# Patient Record
Sex: FEMALE | Race: WHITE | Employment: UNEMPLOYED | URBAN - METROPOLITAN AREA
[De-identification: names, ages, dates, MRNs, and addresses within clinical notes are randomized per-mention and may not be internally consistent; named-entity substitution may affect disease eponyms.]

---

## 2019-09-19 ENCOUNTER — OFFICE VISIT (OUTPATIENT)
Dept: INTERNAL MEDICINE CLINIC | Age: 29
End: 2019-09-19

## 2019-09-19 VITALS
OXYGEN SATURATION: 99 % | HEART RATE: 76 BPM | WEIGHT: 174 LBS | DIASTOLIC BLOOD PRESSURE: 77 MMHG | TEMPERATURE: 98.4 F | BODY MASS INDEX: 29.71 KG/M2 | SYSTOLIC BLOOD PRESSURE: 125 MMHG | HEIGHT: 64 IN | RESPIRATION RATE: 20 BRPM

## 2019-09-19 DIAGNOSIS — R60.9 PERIPHERAL EDEMA: Primary | ICD-10-CM

## 2019-09-19 DIAGNOSIS — L30.9 ECZEMA, UNSPECIFIED TYPE: ICD-10-CM

## 2019-09-19 DIAGNOSIS — R07.89 ATYPICAL CHEST PAIN: ICD-10-CM

## 2019-09-19 RX ORDER — PREDNISONE 10 MG/1
TABLET ORAL
Refills: 0 | COMMUNITY
Start: 2019-09-10 | End: 2019-09-30 | Stop reason: ALTCHOICE

## 2019-09-19 RX ORDER — TRIAMCINOLONE ACETONIDE 1 MG/G
OINTMENT TOPICAL 2 TIMES DAILY
Qty: 30 G | Refills: 0 | Status: SHIPPED | OUTPATIENT
Start: 2019-09-19 | End: 2020-01-23

## 2019-09-19 RX ORDER — HYDROXYZINE PAMOATE 25 MG/1
CAPSULE ORAL
Refills: 0 | COMMUNITY
Start: 2019-09-10 | End: 2021-09-08

## 2019-09-19 NOTE — PROGRESS NOTES
Chief Complaint   Patient presents with   Honey Garcia Care    Skin Problem     both hands    Swelling     ankles and feet    Ear Pain    Back Pain     She is a 30 y.o. female who presents to the office to get established. She does not speak english and so today we will be using the blue phone for assistance. She reports she has not seen a doctor in a very long time. Today she has several concerns to discuss. She has a history of of eczema and currently works in a d[employer]. She reports she does use her hands a lot during the work day but although she is wearing gloves she is still having a lot of cracking of the skin. She has been to the hospital before for this and was prescribed an antihistamine and oral steroids. This did not work and she was told she would probably need to see a dermatologist.   She states she has been having swelling of her feet and ankles. She is on her feet a lot at work. She reports she does not use salt on her foods. She has not had labs done in a very long time. She states she has had some chest pain with deep breathing that started yesterday. She states she does not remember hurting herself. She denies shortness of breath.     No Known Allergies  Past Medical History:   Diagnosis Date    Eczema      Family History   Problem Relation Age of Onset    Heart Disease Father     Breast Cancer Paternal Aunt     Diabetes Paternal Aunt      Past Surgical History:   Procedure Laterality Date    HX CHOLECYSTECTOMY  12/11/2013    [facility]     Social History     Socioeconomic History    Marital status: SINGLE     Spouse name: Not on file    Number of children: Not on file    Years of education: Not on file    Highest education level: Not on file   Occupational History    Not on file   Social Needs    Financial resource strain: Not on file    Food insecurity:     Worry: Not on file     Inability: Not on file    Transportation needs:     Medical: Not on file     Non-medical: Not on file Tobacco Use    Smoking status: Current Every Day Smoker     Years: 3.00     Types: Cigarettes    Smokeless tobacco: Never Used    Tobacco comment: 2 cigarettes a day   Substance and Sexual Activity    Alcohol use: Never     Frequency: Never    Drug use: Never    Sexual activity: Yes     Partners: Male     Birth control/protection: None     Comment: no protection   Lifestyle    Physical activity:     Days per week: Not on file     Minutes per session: Not on file    Stress: Not on file   Relationships    Social connections:     Talks on phone: Not on file     Gets together: Not on file     Attends Oriental orthodox service: Not on file     Active member of club or organization: Not on file     Attends meetings of clubs or organizations: Not on file     Relationship status: Not on file    Intimate partner violence:     Fear of current or ex partner: Not on file     Emotionally abused: Not on file     Physically abused: Not on file     Forced sexual activity: Not on file   Other Topics Concern    Not on file   Social History Narrative    Not on file       Reviewed PmHx, RxHx, FmHx, SocHx, AllgHx and updated and dated in the chart. There are no active problems to display for this patient. Review of Systems - negative except as listed above in the HPI    Objective:     Vitals:    09/19/19 0823   BP: 125/77   Pulse: 76   Resp: 20   Temp: 98.4 °F (36.9 °C)   TempSrc: Oral   SpO2: 99%   Weight: 174 lb (78.9 kg)   Height: 5' 4\" (1.626 m)     Physical Examination: General appearance - alert, well appearing, and in no distress  Chest - clear to auscultation, no wheezes, rales or rhonchi, symmetric air entry  Heart - normal rate and regular rhythm, no murmurs noted  Musculoskeletal - tenderness noted of upper back left side. Anterior chest pain with deep breathing.  Tenderness noted with palpation  Extremities - pedal edema trace edema +, intact peripheral pulses    Assessment/ Plan:   Diagnoses and all orders for this visit:    1. Peripheral edema  -     METABOLIC PANEL, COMPREHENSIVE    2. Atypical chest pain  -     XR CHEST PA LAT; Future    3. Eczema, unspecified type  -     triamcinolone acetonide (KENALOG) 0.1 % ointment; Apply  to affected area two (2) times a day. use thin layer  -     REFERRAL TO DERMATOLOGY       patient to get chest x-ray done today, I would like for her to get a lab done today. She will be contact with the results. We discussed eczema today and things she should avoid that may make her eczema worse. I did give her a referral to see the dermatologist as well. I suspect her pain is muscular. We talked about causes of edema. I would like for her to get shoes with better support when she is on her feet for prolonged periods of time. She is to get some support stockings to wear during the day. She will be contact with the results on the labs. I have ask that the patient return in one week so se can continue to address some more of her concerns. I have discussed the diagnosis with the patient and the intended plan as seen in the above orders. The patient understands and agrees with the plan. The patient has received an after-visit summary and questions were answered concerning future plans. Medication Side Effects and Warnings were discussed with patient  Patient Labs were reviewed and or requested:  Patient Past Records were reviewed and or requested    Cee Huddleston PA-C    There are no Patient Instructions on file for this visit.

## 2019-09-19 NOTE — PROGRESS NOTES
Patient states she is willing to quit smoking, will need counseling. Patient has not had a pap in a long time. Patient has a skin issue on both hands. Patient has swelling in both feet and ankles. Patient states she bruises easily. Ear  Fullness. Left back pain that radiates to the left chest area.

## 2019-09-20 ENCOUNTER — HOSPITAL ENCOUNTER (OUTPATIENT)
Dept: GENERAL RADIOLOGY | Age: 29
Discharge: HOME | End: 2019-09-20
Payer: MEDICAID

## 2019-09-20 DIAGNOSIS — R07.89 ATYPICAL CHEST PAIN: ICD-10-CM

## 2019-09-20 LAB
ALBUMIN SERPL-MCNC: 4.1 G/DL (ref 3.5–5.5)
ALBUMIN/GLOB SERPL: 1.6 {RATIO} (ref 1.2–2.2)
ALP SERPL-CCNC: 67 IU/L (ref 39–117)
ALT SERPL-CCNC: 11 IU/L (ref 0–32)
AST SERPL-CCNC: 15 IU/L (ref 0–40)
BILIRUB SERPL-MCNC: <0.2 MG/DL (ref 0–1.2)
BUN SERPL-MCNC: 7 MG/DL (ref 6–20)
BUN/CREAT SERPL: 11 (ref 9–23)
CALCIUM SERPL-MCNC: 9.1 MG/DL (ref 8.7–10.2)
CHLORIDE SERPL-SCNC: 104 MMOL/L (ref 96–106)
CO2 SERPL-SCNC: 20 MMOL/L (ref 20–29)
CREAT SERPL-MCNC: 0.63 MG/DL (ref 0.57–1)
GLOBULIN SER CALC-MCNC: 2.6 G/DL (ref 1.5–4.5)
GLUCOSE SERPL-MCNC: 81 MG/DL (ref 65–99)
POTASSIUM SERPL-SCNC: 4.6 MMOL/L (ref 3.5–5.2)
PROT SERPL-MCNC: 6.7 G/DL (ref 6–8.5)
SODIUM SERPL-SCNC: 138 MMOL/L (ref 134–144)

## 2019-09-20 PROCEDURE — 71046 X-RAY EXAM CHEST 2 VIEWS: CPT

## 2019-09-20 NOTE — PROGRESS NOTES
Writer attempted to reach patient and contact PHI on file, no answer. Sent letter with results in [address] and to contact the office with information regarding the chest xray.

## 2019-09-30 ENCOUNTER — OFFICE VISIT (OUTPATIENT)
Dept: INTERNAL MEDICINE CLINIC | Age: 29
End: 2019-09-30

## 2019-09-30 VITALS
DIASTOLIC BLOOD PRESSURE: 70 MMHG | WEIGHT: 175 LBS | HEART RATE: 75 BPM | SYSTOLIC BLOOD PRESSURE: 129 MMHG | BODY MASS INDEX: 29.88 KG/M2 | TEMPERATURE: 98.3 F | HEIGHT: 64 IN | OXYGEN SATURATION: 99 % | RESPIRATION RATE: 20 BRPM

## 2019-09-30 DIAGNOSIS — R60.0 MILD PERIPHERAL EDEMA: Primary | ICD-10-CM

## 2019-09-30 DIAGNOSIS — E66.9 OBESITY (BMI 30.0-34.9): ICD-10-CM

## 2019-09-30 DIAGNOSIS — Z83.3 FAMILY HISTORY OF DIABETES MELLITUS (DM): ICD-10-CM

## 2019-09-30 LAB — HBA1C MFR BLD HPLC: 5.4 %

## 2019-09-30 NOTE — PROGRESS NOTES
Patient states she is following up from previous visit in 1 week. Chief Complaint   Patient presents with    Follow Up Chronic Condition     she is a 30y.o. year old female who presents for follow up. She states since the last visit her chest pain has resolved. She has not picked up support stockings or got new shoes. She reports she is still having edema to her. She denies shortness of breath or chest pain. She states she is not eating salt. Reviewed and agree with Nurse Note and duplicated in this note. Reviewed PmHx, RxHx, FmHx, SocHx, AllgHx and updated and dated in the chart. Review of Systems - negative except as listed above    Objective:     Vitals:    09/30/19 0943   BP: 129/70   Pulse: 75   Resp: 20   Temp: 98.3 °F (36.8 °C)   TempSrc: Oral   SpO2: 99%   Weight: 175 lb (79.4 kg)   Height: 5' 4\" (1.626 m)     Physical Examination: General appearance - alert, well appearing, and in no distress and overweight  Chest - clear to auscultation, no wheezes, rales or rhonchi, symmetric air entry  Heart - normal rate and regular rhythm, no murmurs noted  Extremities - no pedal edema noted, intact peripheral pulses    Assessment/ Plan:   Diagnoses and all orders for this visit:    1. Mild peripheral edema    2. Obesity (BMI 30.0-34.9)    3. Family history of diabetes mellitus (DM)  -     AMB POC HEMOGLOBIN A1C       I explained to the patient that I really am not able to appreciate any pitting edema today. Her feet is fat. I would like for her to work on her weight and get the compression stockings and better shoes like we discussed. I understand she is not shaking salt on her food but she may be taking in hidden sodium. I have print off for her some information about a healthy diet. I can get her an appointment with a dietician if she feel she need additional help with this. Today's visit was accomplished with the help of an .     Total encounter time was 25 minutes;>50 percent of time spent counseling/coordinatin care regarding peripheral edema, diet changes, and getting proper shoe wear for long periods of standing on the hard floor. I have discussed the diagnosis with the patient and the intended plan as seen in the above orders. The patient has received an after-visit summary and questions were answered concerning future plans.      Medication Side Effects and Warnings were discussed with patient: n/a  Patient Labs were reviewed and or requested: yes  Patient Past Records were reviewed and or requested  yes    Catalina Farah PA-C

## 2019-10-14 ENCOUNTER — TELEPHONE (OUTPATIENT)
Dept: INTERNAL MEDICINE CLINIC | Age: 29
End: 2019-10-14

## 2019-10-14 NOTE — LETTER
10/14/2019 2:42 PM 
 
Ms. Dsouza Devoid [address] [place] [address] To whom it may concern: A person can not spread eczema to others. However, if you have open wounds I would not advise handling food without protective gear. This would best to protect the patient and the patrons. Please feel free to contact me if additional concerns.  
 
 
 
 
Sincerely, 
 
 
Jerome Farah PA-C

## 2020-01-22 DIAGNOSIS — L30.9 ECZEMA, UNSPECIFIED TYPE: ICD-10-CM

## 2020-01-23 RX ORDER — TRIAMCINOLONE ACETONIDE 1 MG/G
OINTMENT TOPICAL 2 TIMES DAILY
Qty: 30 G | Refills: 0 | Status: SHIPPED | OUTPATIENT
Start: 2020-01-23 | End: 2020-06-04 | Stop reason: SDUPTHER

## 2020-06-04 DIAGNOSIS — L30.9 ECZEMA, UNSPECIFIED TYPE: ICD-10-CM

## 2020-06-04 RX ORDER — TRIAMCINOLONE ACETONIDE 1 MG/G
OINTMENT TOPICAL 2 TIMES DAILY
Qty: 30 G | Refills: 0 | Status: SHIPPED | OUTPATIENT
Start: 2020-06-04 | End: 2021-09-08

## 2021-09-02 ENCOUNTER — APPOINTMENT (OUTPATIENT)
Dept: GENERAL RADIOLOGY | Age: 31
End: 2021-09-02
Attending: PHYSICIAN ASSISTANT
Payer: MEDICAID

## 2021-09-02 ENCOUNTER — HOSPITAL ENCOUNTER (EMERGENCY)
Age: 31
Discharge: ANOTHER HOSPITAL | End: 2021-09-02
Attending: EMERGENCY MEDICINE
Payer: MEDICAID

## 2021-09-02 ENCOUNTER — HOSPITAL ENCOUNTER (INPATIENT)
Age: 31
LOS: 6 days | Discharge: HOME | DRG: 385 | End: 2021-09-08
Attending: INTERNAL MEDICINE | Admitting: INTERNAL MEDICINE
Payer: MEDICAID

## 2021-09-02 VITALS
OXYGEN SATURATION: 100 % | TEMPERATURE: 98.7 F | DIASTOLIC BLOOD PRESSURE: 55 MMHG | RESPIRATION RATE: 20 BRPM | SYSTOLIC BLOOD PRESSURE: 109 MMHG | HEIGHT: 65 IN | HEART RATE: 85 BPM | WEIGHT: 175 LBS | BODY MASS INDEX: 29.16 KG/M2

## 2021-09-02 DIAGNOSIS — Z88.2 ALLERGY TO SULFA DRUGS: ICD-10-CM

## 2021-09-02 DIAGNOSIS — A41.9 SEPTIC SHOCK (HCC): Primary | ICD-10-CM

## 2021-09-02 DIAGNOSIS — L03.116 CELLULITIS OF LEFT LOWER EXTREMITY: ICD-10-CM

## 2021-09-02 DIAGNOSIS — R65.21 SEPTIC SHOCK (HCC): Primary | ICD-10-CM

## 2021-09-02 LAB
ALBUMIN SERPL-MCNC: 2.9 G/DL (ref 3.5–5)
ALBUMIN/GLOB SERPL: 0.7 {RATIO} (ref 1.1–2.2)
ALP SERPL-CCNC: 57 U/L (ref 45–117)
ALT SERPL-CCNC: 22 U/L (ref 12–78)
ANION GAP SERPL CALC-SCNC: 9 MMOL/L (ref 5–15)
APPEARANCE UR: CLEAR
AST SERPL-CCNC: 21 U/L (ref 15–37)
BACTERIA URNS QL MICRO: NEGATIVE /HPF
BASOPHILS # BLD: 0 K/UL (ref 0–0.1)
BASOPHILS NFR BLD: 0 % (ref 0–1)
BILIRUB SERPL-MCNC: 0.3 MG/DL (ref 0.2–1)
BILIRUB UR QL: NEGATIVE
BUN SERPL-MCNC: 6 MG/DL (ref 6–20)
BUN/CREAT SERPL: 7 (ref 12–20)
CALCIUM SERPL-MCNC: 7.7 MG/DL (ref 8.5–10.1)
CHLORIDE SERPL-SCNC: 101 MMOL/L (ref 97–108)
CO2 SERPL-SCNC: 22 MMOL/L (ref 21–32)
COLOR UR: ABNORMAL
CREAT SERPL-MCNC: 0.86 MG/DL (ref 0.55–1.02)
DIFFERENTIAL METHOD BLD: ABNORMAL
EOSINOPHIL # BLD: 0 K/UL (ref 0–0.4)
EOSINOPHIL NFR BLD: 0 % (ref 0–7)
EPITH CASTS URNS QL MICRO: ABNORMAL /LPF
ERYTHROCYTE [DISTWIDTH] IN BLOOD BY AUTOMATED COUNT: 15.6 % (ref 11.5–14.5)
FLUAV RNA SPEC QL NAA+PROBE: NOT DETECTED
FLUBV RNA SPEC QL NAA+PROBE: NOT DETECTED
GLOBULIN SER CALC-MCNC: 4 G/DL (ref 2–4)
GLUCOSE SERPL-MCNC: 103 MG/DL (ref 65–100)
GLUCOSE UR STRIP.AUTO-MCNC: NEGATIVE MG/DL
HCG UR QL: NEGATIVE
HCT VFR BLD AUTO: 38.8 % (ref 35–47)
HGB BLD-MCNC: 12.7 G/DL (ref 11.5–16)
HGB UR QL STRIP: ABNORMAL
IMM GRANULOCYTES # BLD AUTO: 0.1 K/UL (ref 0–0.04)
IMM GRANULOCYTES NFR BLD AUTO: 0 % (ref 0–0.5)
KETONES UR QL STRIP.AUTO: NEGATIVE MG/DL
LACTATE SERPL-SCNC: 1.5 MMOL/L (ref 0.4–2)
LEUKOCYTE ESTERASE UR QL STRIP.AUTO: ABNORMAL
LIPASE SERPL-CCNC: 77 U/L (ref 73–393)
LYMPHOCYTES # BLD: 1.3 K/UL (ref 0.8–3.5)
LYMPHOCYTES NFR BLD: 12 % (ref 12–49)
MCH RBC QN AUTO: 26.7 PG (ref 26–34)
MCHC RBC AUTO-ENTMCNC: 32.7 G/DL (ref 30–36.5)
MCV RBC AUTO: 81.5 FL (ref 80–99)
MONOCYTES # BLD: 0.2 K/UL (ref 0–1)
MONOCYTES NFR BLD: 2 % (ref 5–13)
NEUTS SEG # BLD: 9.8 K/UL (ref 1.8–8)
NEUTS SEG NFR BLD: 86 % (ref 32–75)
NITRITE UR QL STRIP.AUTO: NEGATIVE
NRBC # BLD: 0 K/UL (ref 0–0.01)
NRBC BLD-RTO: 0 PER 100 WBC
PH UR STRIP: 6 [PH] (ref 5–8)
PLATELET # BLD AUTO: 299 K/UL (ref 150–400)
PMV BLD AUTO: 9.6 FL (ref 8.9–12.9)
POTASSIUM SERPL-SCNC: 3.4 MMOL/L (ref 3.5–5.1)
PROT SERPL-MCNC: 6.9 G/DL (ref 6.4–8.2)
PROT UR STRIP-MCNC: ABNORMAL MG/DL
RBC # BLD AUTO: 4.76 M/UL (ref 3.8–5.2)
RBC #/AREA URNS HPF: ABNORMAL /HPF (ref 0–5)
SARS-COV-2, COV2: NOT DETECTED
SODIUM SERPL-SCNC: 132 MMOL/L (ref 136–145)
SP GR UR REFRACTOMETRY: 1.01 (ref 1–1.03)
UA: UC IF INDICATED,UAUC: ABNORMAL
UROBILINOGEN UR QL STRIP.AUTO: 0.2 EU/DL (ref 0.2–1)
WBC # BLD AUTO: 11.3 K/UL (ref 3.6–11)
WBC URNS QL MICRO: ABNORMAL /HPF (ref 0–4)

## 2021-09-02 PROCEDURE — 36415 COLL VENOUS BLD VENIPUNCTURE: CPT

## 2021-09-02 PROCEDURE — 74011250636 HC RX REV CODE- 250/636: Performed by: PHYSICIAN ASSISTANT

## 2021-09-02 PROCEDURE — 74011000258 HC RX REV CODE- 258: Performed by: EMERGENCY MEDICINE

## 2021-09-02 PROCEDURE — 65610000006 HC RM INTENSIVE CARE

## 2021-09-02 PROCEDURE — 83690 ASSAY OF LIPASE: CPT

## 2021-09-02 PROCEDURE — 93005 ELECTROCARDIOGRAM TRACING: CPT

## 2021-09-02 PROCEDURE — 75810000455 HC PLCMT CENT VENOUS CATH LVL 2 5182

## 2021-09-02 PROCEDURE — 96367 TX/PROPH/DG ADDL SEQ IV INF: CPT

## 2021-09-02 PROCEDURE — 80053 COMPREHEN METABOLIC PANEL: CPT

## 2021-09-02 PROCEDURE — 96365 THER/PROPH/DIAG IV INF INIT: CPT

## 2021-09-02 PROCEDURE — 81001 URINALYSIS AUTO W/SCOPE: CPT

## 2021-09-02 PROCEDURE — 74011250637 HC RX REV CODE- 250/637: Performed by: PHYSICIAN ASSISTANT

## 2021-09-02 PROCEDURE — 81025 URINE PREGNANCY TEST: CPT

## 2021-09-02 PROCEDURE — 74011000250 HC RX REV CODE- 250: Performed by: EMERGENCY MEDICINE

## 2021-09-02 PROCEDURE — 96361 HYDRATE IV INFUSION ADD-ON: CPT

## 2021-09-02 PROCEDURE — 71045 X-RAY EXAM CHEST 1 VIEW: CPT

## 2021-09-02 PROCEDURE — 85025 COMPLETE CBC W/AUTO DIFF WBC: CPT

## 2021-09-02 PROCEDURE — 87636 SARSCOV2 & INF A&B AMP PRB: CPT

## 2021-09-02 PROCEDURE — 74011250636 HC RX REV CODE- 250/636: Performed by: EMERGENCY MEDICINE

## 2021-09-02 PROCEDURE — 96366 THER/PROPH/DIAG IV INF ADDON: CPT

## 2021-09-02 PROCEDURE — 96375 TX/PRO/DX INJ NEW DRUG ADDON: CPT

## 2021-09-02 PROCEDURE — 83605 ASSAY OF LACTIC ACID: CPT

## 2021-09-02 PROCEDURE — 96372 THER/PROPH/DIAG INJ SC/IM: CPT

## 2021-09-02 PROCEDURE — 99285 EMERGENCY DEPT VISIT HI MDM: CPT

## 2021-09-02 PROCEDURE — 87040 BLOOD CULTURE FOR BACTERIA: CPT

## 2021-09-02 RX ORDER — ACETAMINOPHEN 650 MG/1
650 SUPPOSITORY RECTAL
Status: DISCONTINUED | OUTPATIENT
Start: 2021-09-02 | End: 2021-09-08 | Stop reason: HOSPADM

## 2021-09-02 RX ORDER — DIPHENHYDRAMINE HYDROCHLORIDE 50 MG/ML
25 INJECTION, SOLUTION INTRAMUSCULAR; INTRAVENOUS
Status: COMPLETED | OUTPATIENT
Start: 2021-09-02 | End: 2021-09-02

## 2021-09-02 RX ORDER — CLINDAMYCIN PHOSPHATE 600 MG/50ML
600 INJECTION INTRAVENOUS
Status: COMPLETED | OUTPATIENT
Start: 2021-09-02 | End: 2021-09-02

## 2021-09-02 RX ORDER — EPINEPHRINE 1 MG/ML
0.3 INJECTION, SOLUTION, CONCENTRATE INTRAVENOUS
Status: COMPLETED | OUTPATIENT
Start: 2021-09-02 | End: 2021-09-02

## 2021-09-02 RX ORDER — ENOXAPARIN SODIUM 100 MG/ML
40 INJECTION SUBCUTANEOUS DAILY
Status: DISCONTINUED | OUTPATIENT
Start: 2021-09-03 | End: 2021-09-08 | Stop reason: HOSPADM

## 2021-09-02 RX ORDER — ACETAMINOPHEN 325 MG/1
650 TABLET ORAL
Status: DISCONTINUED | OUTPATIENT
Start: 2021-09-02 | End: 2021-09-08 | Stop reason: HOSPADM

## 2021-09-02 RX ORDER — SODIUM CHLORIDE 0.9 % (FLUSH) 0.9 %
5-40 SYRINGE (ML) INJECTION EVERY 8 HOURS
Status: DISCONTINUED | OUTPATIENT
Start: 2021-09-03 | End: 2021-09-08 | Stop reason: HOSPADM

## 2021-09-02 RX ORDER — ONDANSETRON 2 MG/ML
4 INJECTION INTRAMUSCULAR; INTRAVENOUS
Status: DISCONTINUED | OUTPATIENT
Start: 2021-09-02 | End: 2021-09-08 | Stop reason: HOSPADM

## 2021-09-02 RX ORDER — LORAZEPAM 0.5 MG/1
0.5 TABLET ORAL
Status: DISCONTINUED | OUTPATIENT
Start: 2021-09-02 | End: 2021-09-08 | Stop reason: HOSPADM

## 2021-09-02 RX ORDER — POLYETHYLENE GLYCOL 3350 17 G/17G
17 POWDER, FOR SOLUTION ORAL DAILY PRN
Status: DISCONTINUED | OUTPATIENT
Start: 2021-09-02 | End: 2021-09-08 | Stop reason: HOSPADM

## 2021-09-02 RX ORDER — ACETAMINOPHEN 500 MG
1000 TABLET ORAL
Status: COMPLETED | OUTPATIENT
Start: 2021-09-02 | End: 2021-09-02

## 2021-09-02 RX ORDER — FAMOTIDINE 20 MG/1
20 TABLET, FILM COATED ORAL
Status: COMPLETED | OUTPATIENT
Start: 2021-09-02 | End: 2021-09-02

## 2021-09-02 RX ORDER — KETOROLAC TROMETHAMINE 30 MG/ML
30 INJECTION, SOLUTION INTRAMUSCULAR; INTRAVENOUS ONCE
Status: COMPLETED | OUTPATIENT
Start: 2021-09-02 | End: 2021-09-02

## 2021-09-02 RX ORDER — SODIUM CHLORIDE 0.9 % (FLUSH) 0.9 %
5-40 SYRINGE (ML) INJECTION AS NEEDED
Status: DISCONTINUED | OUTPATIENT
Start: 2021-09-02 | End: 2021-09-08 | Stop reason: HOSPADM

## 2021-09-02 RX ORDER — IPRATROPIUM BROMIDE AND ALBUTEROL SULFATE 2.5; .5 MG/3ML; MG/3ML
3 SOLUTION RESPIRATORY (INHALATION)
Status: DISCONTINUED | OUTPATIENT
Start: 2021-09-03 | End: 2021-09-03

## 2021-09-02 RX ORDER — CLINDAMYCIN HYDROCHLORIDE 300 MG/1
300 CAPSULE ORAL 4 TIMES DAILY
COMMUNITY
End: 2021-09-08

## 2021-09-02 RX ORDER — SODIUM CHLORIDE, SODIUM LACTATE, POTASSIUM CHLORIDE, CALCIUM CHLORIDE 600; 310; 30; 20 MG/100ML; MG/100ML; MG/100ML; MG/100ML
100 INJECTION, SOLUTION INTRAVENOUS CONTINUOUS
Status: DISCONTINUED | OUTPATIENT
Start: 2021-09-03 | End: 2021-09-06

## 2021-09-02 RX ORDER — SODIUM CHLORIDE 0.9 % (FLUSH) 0.9 %
5-10 SYRINGE (ML) INJECTION AS NEEDED
Status: DISCONTINUED | OUTPATIENT
Start: 2021-09-02 | End: 2021-09-02 | Stop reason: HOSPADM

## 2021-09-02 RX ORDER — NOREPINEPHRINE BITARTRATE/D5W 8 MG/250ML
.5-2 PLASTIC BAG, INJECTION (ML) INTRAVENOUS
Status: DISCONTINUED | OUTPATIENT
Start: 2021-09-02 | End: 2021-09-02

## 2021-09-02 RX ORDER — ONDANSETRON 2 MG/ML
4 INJECTION INTRAMUSCULAR; INTRAVENOUS
Status: COMPLETED | OUTPATIENT
Start: 2021-09-02 | End: 2021-09-02

## 2021-09-02 RX ORDER — ONDANSETRON 4 MG/1
4 TABLET, ORALLY DISINTEGRATING ORAL
Status: DISCONTINUED | OUTPATIENT
Start: 2021-09-02 | End: 2021-09-08 | Stop reason: HOSPADM

## 2021-09-02 RX ADMIN — SODIUM CHLORIDE 1000 ML: 9 INJECTION, SOLUTION INTRAVENOUS at 15:51

## 2021-09-02 RX ADMIN — DIPHENHYDRAMINE HYDROCHLORIDE 25 MG: 50 INJECTION INTRAMUSCULAR; INTRAVENOUS at 15:52

## 2021-09-02 RX ADMIN — VANCOMYCIN HYDROCHLORIDE 2000 MG: 1 INJECTION, POWDER, LYOPHILIZED, FOR SOLUTION INTRAVENOUS at 21:23

## 2021-09-02 RX ADMIN — SODIUM CHLORIDE 382 ML: 9 INJECTION, SOLUTION INTRAVENOUS at 16:57

## 2021-09-02 RX ADMIN — EPINEPHRINE 0.3 MG: 1 INJECTION INTRAMUSCULAR; INTRAVENOUS; SUBCUTANEOUS at 15:12

## 2021-09-02 RX ADMIN — FAMOTIDINE 20 MG: 20 TABLET ORAL at 15:13

## 2021-09-02 RX ADMIN — ONDANSETRON 4 MG: 2 INJECTION INTRAMUSCULAR; INTRAVENOUS at 15:51

## 2021-09-02 RX ADMIN — SODIUM CHLORIDE 1000 ML: 9 INJECTION, SOLUTION INTRAVENOUS at 18:08

## 2021-09-02 RX ADMIN — EPINEPHRINE 0.3 MG: 1 INJECTION INTRAMUSCULAR; INTRAVENOUS; SUBCUTANEOUS at 18:08

## 2021-09-02 RX ADMIN — SODIUM CHLORIDE 1000 ML: 9 INJECTION, SOLUTION INTRAVENOUS at 15:49

## 2021-09-02 RX ADMIN — CLINDAMYCIN IN 5 PERCENT DEXTROSE 600 MG: 12 INJECTION, SOLUTION INTRAVENOUS at 18:08

## 2021-09-02 RX ADMIN — KETOROLAC TROMETHAMINE 30 MG: 30 INJECTION, SOLUTION INTRAMUSCULAR; INTRAVENOUS at 15:52

## 2021-09-02 RX ADMIN — NOREPINEPHRINE BITARTRATE 4 MCG/MIN: 1 INJECTION INTRAVENOUS at 21:17

## 2021-09-02 RX ADMIN — ACETAMINOPHEN 1000 MG: 500 TABLET ORAL at 15:13

## 2021-09-02 NOTE — ED PROVIDER NOTES
EMERGENCY DEPARTMENT HISTORY AND PHYSICAL EXAM    Date: 9/2/2021  Patient Name: Debi Casey    History of Presenting Illness     Chief Complaint   Patient presents with    Allergic Reaction    Fever         History Provided By: Patient via [address] [facility]     HPI: Debi Casey is a 28 y.o. female with a PMH of eczema who presents via EMS with rash, fever, nausea and vomiting. Patient states she was diagnosed with cellulitis about x1wk and started on Bactrim and Keflex. Pt then developed a rash 4 days ago and was then seen at [facility] yesterday for an allergic reaction likely from the Bactrim so they advised patient to stop the Bactrim and Keflex and started her on clindamycin. Patient states she is no better today and is having fever, weakness, nausea and vomiting x 2 days. LMP now. Patient states she was given Benadryl yesterday but has not taken any medication today. PCP: Sung Russ PA-C    Current Facility-Administered Medications   Medication Dose Route Frequency Provider Last Rate Last Admin    sodium chloride (NS) flush 5-10 mL  5-10 mL IntraVENous PRN Saul Church PA-C        vancomycin (VANCOCIN) 2,000 mg in 0.9% sodium chloride 500 mL IVPB  2,000 mg IntraVENous ONCE Driss Baker MD        NOREPINephrine (LEVOPHED) 8,000 mcg in dextrose 5% 250 mL infusion  0.5-20 mcg/min IntraVENous TITRATE Driss Baker MD         Current Outpatient Medications   Medication Sig Dispense Refill    clindamycin (CLEOCIN) 300 mg capsule Take 300 mg by mouth four (4) times daily.  triamcinolone acetonide (KENALOG) 0.1 % ointment Apply  to affected area two (2) times a day.  use thin layer 30 g 0    hydrOXYzine pamoate (VISTARIL) 25 mg capsule TOME ISABEL CAPSULA POR V?A ORAL CADA OCHO HORAS CUANDO SEA NECESARIO FOR ITCHING/SWELLING  0       Past History     Past Medical History:  Past Medical History:   Diagnosis Date    Eczema        Past Surgical History:  Past Surgical History:   Procedure Laterality Date    HX CHOLECYSTECTOMY  12/11/2013    [facility] [place]       Family History:  Family History   Problem Relation Age of Onset    Heart Disease Father     Breast Cancer Paternal Aunt     Diabetes Paternal Aunt        Social History:  Social History     Tobacco Use    Smoking status: Current Every Day Smoker     Years: 3.00     Types: Cigarettes    Smokeless tobacco: Never Used    Tobacco comment: 2 cigarettes a day   Substance Use Topics    Alcohol use: Never    Drug use: Never       Allergies: Allergies   Allergen Reactions    Bactrim [Sulfamethoxazole-Trimethoprim] Rash         Review of Systems   Review of Systems   Constitutional: Positive for activity change, appetite change and fever. Gastrointestinal: Positive for nausea and vomiting. Skin: Positive for color change and rash. Allergic/Immunologic: Negative for immunocompromised state. Neurological: Positive for weakness. All other systems reviewed and are negative. Physical Exam     Vitals:    09/02/21 1829 09/02/21 1830 09/02/21 1928 09/02/21 2000   BP: (!) 99/53 (!) 99/53 (!) 88/34 (!) 91/49   Pulse: 89 83  85   Resp: 16   20   Temp: 99 °F (37.2 °C)   98.6 °F (37 °C)   SpO2: 100% 100%  100%   Weight:       Height:         Physical Exam  Vitals and nursing note reviewed. Constitutional:       General: She is not in acute distress. Appearance: She is well-developed. HENT:      Head: Normocephalic and atraumatic. Eyes:      Conjunctiva/sclera: Conjunctivae normal.   Cardiovascular:      Rate and Rhythm: Normal rate and regular rhythm. Heart sounds: Normal heart sounds. Pulmonary:      Effort: Pulmonary effort is normal. No respiratory distress. Breath sounds: Normal breath sounds. No wheezing or rales. Skin:     General: Skin is warm and dry. Findings: Erythema and rash present. Rash is macular ( Scattered macular lesions about the entire body). Neurological:      Mental Status: She is alert and oriented to person, place, and time. Psychiatric:         Behavior: Behavior normal.         Thought Content: Thought content normal.         Judgment: Judgment normal.           Diagnostic Study Results     Labs -     Recent Results (from the past 12 hour(s))   EKG, 12 LEAD, INITIAL    Collection Time: 09/02/21  3:40 PM   Result Value Ref Range    Ventricular Rate 105 BPM    Atrial Rate 105 BPM    P-R Interval 124 ms    QRS Duration 90 ms    Q-T Interval 310 ms    QTC Calculation (Bezet) 409 ms    Calculated P Axis 48 degrees    Calculated R Axis 80 degrees    Calculated T Axis 8 degrees    Diagnosis       Sinus tachycardia  Nonspecific T wave abnormality  Abnormal ECG  No previous ECGs available     CBC WITH AUTOMATED DIFF    Collection Time: 09/02/21  3:42 PM   Result Value Ref Range    WBC 11.3 (H) 3.6 - 11.0 K/uL    RBC 4.76 3.80 - 5.20 M/uL    HGB 12.7 11.5 - 16.0 g/dL    HCT 38.8 35.0 - 47.0 %    MCV 81.5 80.0 - 99.0 FL    MCH 26.7 26.0 - 34.0 PG    MCHC 32.7 30.0 - 36.5 g/dL    RDW 15.6 (H) 11.5 - 14.5 %    PLATELET 416 730 - 969 K/uL    MPV 9.6 8.9 - 12.9 FL    NRBC 0.0 0  WBC    ABSOLUTE NRBC 0.00 0.00 - 0.01 K/uL    NEUTROPHILS 86 (H) 32 - 75 %    LYMPHOCYTES 12 12 - 49 %    MONOCYTES 2 (L) 5 - 13 %    EOSINOPHILS 0 0 - 7 %    BASOPHILS 0 0 - 1 %    IMMATURE GRANULOCYTES 0 0.0 - 0.5 %    ABS. NEUTROPHILS 9.8 (H) 1.8 - 8.0 K/UL    ABS. LYMPHOCYTES 1.3 0.8 - 3.5 K/UL    ABS. MONOCYTES 0.2 0.0 - 1.0 K/UL    ABS. EOSINOPHILS 0.0 0.0 - 0.4 K/UL    ABS. BASOPHILS 0.0 0.0 - 0.1 K/UL    ABS. IMM.  GRANS. 0.1 (H) 0.00 - 0.04 K/UL    DF AUTOMATED     METABOLIC PANEL, COMPREHENSIVE    Collection Time: 09/02/21  3:42 PM   Result Value Ref Range    Sodium 132 (L) 136 - 145 mmol/L    Potassium 3.4 (L) 3.5 - 5.1 mmol/L    Chloride 101 97 - 108 mmol/L    CO2 22 21 - 32 mmol/L    Anion gap 9 5 - 15 mmol/L    Glucose 103 (H) 65 - 100 mg/dL    BUN 6 6 - 20 MG/DL    Creatinine 0.86 0.55 - 1.02 MG/DL    BUN/Creatinine ratio 7 (L) 12 - 20      GFR est AA >60 >60 ml/min/1.73m2    GFR est non-AA >60 >60 ml/min/1.73m2    Calcium 7.7 (L) 8.5 - 10.1 MG/DL    Bilirubin, total 0.3 0.2 - 1.0 MG/DL    ALT (SGPT) 22 12 - 78 U/L    AST (SGOT) 21 15 - 37 U/L    Alk.  phosphatase 57 45 - 117 U/L    Protein, total 6.9 6.4 - 8.2 g/dL    Albumin 2.9 (L) 3.5 - 5.0 g/dL    Globulin 4.0 2.0 - 4.0 g/dL    A-G Ratio 0.7 (L) 1.1 - 2.2     LIPASE    Collection Time: 09/02/21  3:42 PM   Result Value Ref Range    Lipase 77 73 - 393 U/L   LACTIC ACID    Collection Time: 09/02/21  3:42 PM   Result Value Ref Range    Lactic acid 1.5 0.4 - 2.0 MMOL/L   COVID-19 WITH INFLUENZA A/B    Collection Time: 09/02/21  3:42 PM   Result Value Ref Range    SARS-CoV-2 Not detected NOTD      Influenza A by PCR Not detected      Influenza B by PCR Not detected     URINALYSIS W/ REFLEX CULTURE    Collection Time: 09/02/21  4:52 PM    Specimen: Urine   Result Value Ref Range    Color YELLOW/STRAW      Appearance CLEAR CLEAR      Specific gravity 1.010 1.003 - 1.030      pH (UA) 6.0 5.0 - 8.0      Protein TRACE (A) NEG mg/dL    Glucose Negative NEG mg/dL    Ketone Negative NEG mg/dL    Bilirubin Negative NEG      Blood LARGE (A) NEG      Urobilinogen 0.2 0.2 - 1.0 EU/dL    Nitrites Negative NEG      Leukocyte Esterase SMALL (A) NEG      WBC 5-10 0 - 4 /hpf    RBC 20-50 0 - 5 /hpf    Epithelial cells FEW FEW /lpf    Bacteria Negative NEG /hpf    UA:UC IF INDICATED CULTURE NOT INDICATED BY UA RESULT CNI     HCG URINE, QL. - POC    Collection Time: 09/02/21  5:00 PM   Result Value Ref Range    Pregnancy test,urine (POC) Negative NEG         Radiologic Studies -   XR CHEST PORT   Final Result   No acute findings        CT Results  (Last 48 hours)    None        CXR Results  (Last 48 hours)               09/02/21 1623  XR CHEST PORT Final result    Impression:  No acute findings       Narrative:  EXAM: AP portable chest x-ray       INDICATION: Sepsis       COMPARISON: 9/20/2019       FINDINGS: A portable AP radiograph of the chest was obtained at 1615 hours. There is no pneumothorax or pleural effusion. The lungs are clear. The cardiac   and mediastinal contours and pulmonary vascularity are normal.  No hilar   enlargement is shown. No osseous abnormality is demonstrated. Medical Decision Making   I am the first provider for this patient. I reviewed the vital signs, available nursing notes, past medical history, past surgical history, family history and social history. Vital Signs-Reviewed the patient's vital signs. Records Reviewed: Nursing Notes and Old Medical Records    Provider Notes (Medical Decision Making):   Patient presents with likely allergic reaction to Bactrim as well as nausea, vomiting and fever most likely secondary to cellulitis. DDx: Bactrim allergy, sepsis, bacteremia, erythema multiforme, COVID-19    ED Course as of Sep 02 2047   Thu Sep 02, 2021   1510 Discussed case with attending, Dr. Bradley Mcdaniels who also went to see patient and agrees that this is likely an allergic reaction to Bactrim but he would like to also give patient IM epi now as well as start sepsis protocol    []   1540 Procedure Note - Peripheral Line Placement:   3:40 PM  Performed by: Jesus White MD      Immediately prior to the procedure, the patient was reevaluated and found suitable for the planned procedure and any planned medications. Immediately prior to the procedure a time out was called to verify the correct patient, procedure, equipment, staff, and marking as appropriate. Area was cleansed with Betadine. Prepped and draped in sterile fashion. Landmarks identified. 20G needle with catheter was inserted into pt's Right, External Jugular Vein without ultrasound guidance.      Position: Trendelenburg  Number of attempts: 1  Estimated blood loss:none   The procedure took 1-15 minutes, and pt tolerated well.       [SB]   6276 ED EKG interpretation:  Rhythm: sinus tachycardia; and regular . Rate (approx.): 105; Axis: normal; P wave: normal; QRS interval: normal ; ST/T wave: normal; Other findings: normal. This EKG was interpreted by ED attending, Dr Betsy Gallegos and Hyacinth Mahoney PA-C,ED Provider.        Raleigh Clifford   8015 Discussed VS with attending, and pt's dispo plan he advised to give another round of epi and another L of IV fluids. If BP remains low she will need admission for at least observation    [AH]   1940 After 3 L of fluids pt BP not improving, night ED attending, Dr Saray Teague, went to evaluate pt and feels that she is in septic shock and should be transferred to step down/ICU. Will consult intensivist at [facility] for transfer and he will start pt on Levophed    [AH]   2004 I spoke with Ricardo Renteria NP, Consult for [facility]. Discussed available diagnostic tests and clinical findings. He is in agreement with care plans as outlined. He will accept pt for transfer to ED [facility] ICU   Justin Bridget      [AH]      ED Course User Index  [AH] Syeda Ovalles PA-C  [SB] Bloodw, Hari ANNE     CRITICAL CARE NOTE :    6:10 PM      IMPENDING DETERIORATION -Cardiovascular    ASSOCIATED RISK FACTORS - Hypotension    MANAGEMENT- Bedside Assessment, Supervision of Care, Transfer    INTERPRETATION -  ECG and Blood Pressure    INTERVENTIONS - 3L of NS and 2 doses of 0.3mg of epi, Levophed, Clindamycin, Vancomycin    CASE REVIEW - ED attending, Dr Betsy Gallegos and Dr Saray Teague, NP intensivist at [address] [place] [address] and Physician(see Dr Saray Teague note for additional critical care notes)        NOTES   :      I have spent 55 minutes of critical care time involved in lab review, consultations with specialist, family decision- making, bedside attention and documentation. During this entire length of time I was immediately available to the patient .     Hyacinth Mahoney ABIODUN    Disposition:  Transfer to [facility]      Procedures:  See Dr Zita Blue note for Central Line placement      Procedures    Please note that this dictation was completed with Dragon, computer voice recognition software. Quite often unanticipated grammatical, syntax, homophones, and other interpretive errors are inadvertently transcribed by the computer software. Please disregard these errors. Additionally, please excuse any errors that have escaped final proofreading. Diagnosis     Clinical Impression:   1. Septic shock ([address]    2. Cellulitis of left lower extremity    3.  Allergy to sulfa drugs

## 2021-09-02 NOTE — ED TRIAGE NOTES
Received to room 6 via EMS. By report, patient had been seen at [facility] for a cyst and given antibiotics for which she had a reaction, possibly to bactrim or Keflex. She was discharged on clindamycin. She reports she feels worse and the rash remains.

## 2021-09-02 NOTE — ED NOTES
Assumed care of patient at 7 PM from Dr. Flavia Lewis. In brief, Andorran-speaking patient language services used for history. Seen at outside ER for cellulitis, start on Keflex and Bactrim. Cellulitis to left lower extremity. Developed erythematous rash 2 days ago feeling lightheaded today presented via EMS. Upon arrival, febrile and hypotensive. Despite 3 L of fluids, blood pressure trends down. Concern for shock, consider septic shock, allergic reaction, anaphylaxis. Less likely toxic shock syndrome. Lactate is reassuring. Blood cultures obtained. Will add on vancomycin. Prior to my assessment, patient given IM epi. She is currently on her period, she does not use tampons. Given MAP is less than 60, start peripheral vasopressors. Patient does have a left EJ. Discussed with pharmacy, okay to begin we will start Levophed. Ultrasound being used by anesthesia on the floor. I did place a right femoral line without ultrasound after consent under sterile conditions. On exam toxic appearing female resting in bed, appears weak. She has a left EJ. Not tachycardic with sinus rhythm on the monitor. No abdominal tenderness. There is a maculopapular rash over patient's body. Over the left leg there is a erythematous circular lesion with dusky center. Procedure Note - Central Line Placement:   8:33 PM  Performed by: Myself    Immediately prior to the procedure, the patient was reevaluated and found suitable for the planned procedure and any planned medications. Immediately prior to the procedure a time out was called to verify the correct patient, procedure, equipment, staff, and marking as appropriate. Area was cleansed with Chlorprep and anesthetized with 3 mLs of 1% lidocaine. Prepped and draped in sterile fashion. Landmarks identified. 18G needle with triple lumen catheter was inserted into pt's Right femoral vein without ultrasound guidance.  Line sutured in place; sterile dressing applied. Position: Trendelenburg  Number of attempts: 2  Estimated blood loss: none  Ultrasound guidance was not used for real-time guidance in which the vessel was patent. The procedure took 16-30 minutes, and pt tolerated well. Except it to ICU at THE [facility].  Will start Levophed pressors begin vancomycin. 9:48 PM: Patient's blood pressure improving on 4 mcg/min of Levophed. Now map greater than 65. Patient is a bed available at THE [facility] ICU. Awaiting transport. CRITICAL CARE NOTE :    ~2200 AM    IMPENDING DETERIORATION -Cardiovascular and Metabolic  ASSOCIATED RISK FACTORS - Hypotension, Shock, and Dehydration  MANAGEMENT- Bedside Assessment, Supervision of Care, and Transfer  INTERPRETATION -  Xrays, ECG, and Blood Pressure  INTERVENTIONS - hemodynamic mngmt and vascular control  CASE REVIEW - Hospitalist/Intensivist and Nursing  TREATMENT RESPONSE -Improved  PERFORMED BY - Self and DEANNA    NOTES   :  I have spent 30 minutes of critical care time involved in lab review, consultations with specialist, family decision- making, bedside attention and documentation. This time excludes time spent in any separate billed procedures. During this entire length of time I was immediately available to the patient .     Durga Weeks MD

## 2021-09-03 ENCOUNTER — APPOINTMENT (OUTPATIENT)
Dept: NON INVASIVE DIAGNOSTICS | Age: 31
DRG: 385 | End: 2021-09-03
Attending: INTERNAL MEDICINE
Payer: MEDICAID

## 2021-09-03 ENCOUNTER — APPOINTMENT (OUTPATIENT)
Dept: GENERAL RADIOLOGY | Age: 31
DRG: 385 | End: 2021-09-03
Attending: NURSE PRACTITIONER
Payer: MEDICAID

## 2021-09-03 LAB
ALBUMIN SERPL-MCNC: 2 G/DL (ref 3.5–5)
ALBUMIN SERPL-MCNC: 2.5 G/DL (ref 3.5–5)
ALBUMIN/GLOB SERPL: 0.6 {RATIO} (ref 1.1–2.2)
ALBUMIN/GLOB SERPL: 0.6 {RATIO} (ref 1.1–2.2)
ALP SERPL-CCNC: 61 U/L (ref 45–117)
ALP SERPL-CCNC: 62 U/L (ref 45–117)
ALT SERPL-CCNC: 29 U/L (ref 12–78)
ALT SERPL-CCNC: 46 U/L (ref 12–78)
ANION GAP SERPL CALC-SCNC: 8 MMOL/L (ref 5–15)
ANION GAP SERPL CALC-SCNC: 9 MMOL/L (ref 5–15)
AST SERPL-CCNC: 37 U/L (ref 15–37)
AST SERPL-CCNC: 97 U/L (ref 15–37)
BASOPHILS # BLD: 0 K/UL (ref 0–0.1)
BASOPHILS # BLD: 0 K/UL (ref 0–0.1)
BASOPHILS NFR BLD: 0 % (ref 0–1)
BASOPHILS NFR BLD: 0 % (ref 0–1)
BILIRUB SERPL-MCNC: 0.3 MG/DL (ref 0.2–1)
BILIRUB SERPL-MCNC: 0.5 MG/DL (ref 0.2–1)
BUN SERPL-MCNC: 5 MG/DL (ref 6–20)
BUN SERPL-MCNC: 7 MG/DL (ref 6–20)
BUN/CREAT SERPL: 6 (ref 12–20)
BUN/CREAT SERPL: 8 (ref 12–20)
CALCIUM SERPL-MCNC: 6.7 MG/DL (ref 8.5–10.1)
CALCIUM SERPL-MCNC: 7 MG/DL (ref 8.5–10.1)
CHLORIDE SERPL-SCNC: 108 MMOL/L (ref 97–108)
CHLORIDE SERPL-SCNC: 111 MMOL/L (ref 97–108)
CO2 SERPL-SCNC: 19 MMOL/L (ref 21–32)
CO2 SERPL-SCNC: 21 MMOL/L (ref 21–32)
CREAT SERPL-MCNC: 0.86 MG/DL (ref 0.55–1.02)
CREAT SERPL-MCNC: 0.87 MG/DL (ref 0.55–1.02)
DIFFERENTIAL METHOD BLD: ABNORMAL
DIFFERENTIAL METHOD BLD: ABNORMAL
ECHO AO ROOT DIAM: 1.82 CM
ECHO AV AREA PEAK VELOCITY: 1.54 CM2
ECHO AV AREA VTI: 1.86 CM2
ECHO AV AREA/BSA PEAK VELOCITY: 0.8 CM2/M2
ECHO AV AREA/BSA VTI: 1 CM2/M2
ECHO AV MEAN GRADIENT: 3.46 MMHG
ECHO AV PEAK GRADIENT: 7.05 MMHG
ECHO AV PEAK VELOCITY: 132.79 CM/S
ECHO AV VTI: 18.93 CM
ECHO LA AREA 4C: 18.65 CM2
ECHO LA MAJOR AXIS: 3.19 CM
ECHO LA MINOR AXIS: 1.71 CM
ECHO LA VOL 4C: 54.71 ML (ref 22–52)
ECHO LA VOLUME INDEX A4C: 29.26 ML/M2 (ref 16–28)
ECHO LV E' LATERAL VELOCITY: 16.6 CM/S
ECHO LV E' SEPTAL VELOCITY: 12.85 CM/S
ECHO LV EDV A4C: 144.27 ML
ECHO LV EDV INDEX A4C: 77.1 ML/M2
ECHO LV EJECTION FRACTION A4C: 37 PERCENT
ECHO LV ESV A4C: 90.95 ML
ECHO LV ESV INDEX A4C: 48.6 ML/M2
ECHO LV INTERNAL DIMENSION DIASTOLIC: 4.76 CM (ref 3.9–5.3)
ECHO LV INTERNAL DIMENSION SYSTOLIC: 4.27 CM
ECHO LV IVSD: 1 CM (ref 0.6–0.9)
ECHO LV MASS 2D: 170.2 G (ref 67–162)
ECHO LV MASS INDEX 2D: 91 G/M2 (ref 43–95)
ECHO LV POSTERIOR WALL DIASTOLIC: 1.02 CM (ref 0.6–0.9)
ECHO LVOT DIAM: 1.6 CM
ECHO LVOT PEAK GRADIENT: 4.12 MMHG
ECHO LVOT PEAK VELOCITY: 101.53 CM/S
ECHO LVOT SV: 35.2 ML
ECHO LVOT VTI: 17.45 CM
ECHO MV A VELOCITY: 54.16 CM/S
ECHO MV AREA PHT: 5.09 CM2
ECHO MV AREA VTI: 1.84 CM2
ECHO MV E DECELERATION TIME (DT): 158.74 MS
ECHO MV E VELOCITY: 94.53 CM/S
ECHO MV E/A RATIO: 1.75
ECHO MV E/E' LATERAL: 5.69
ECHO MV E/E' RATIO (AVERAGED): 6.53
ECHO MV E/E' SEPTAL: 7.36
ECHO MV EROA PISA: 0.32 CM2
ECHO MV MAX VELOCITY: 105.11 CM/S
ECHO MV MEAN GRADIENT: 1.68 MMHG
ECHO MV PEAK GRADIENT: 4.42 MMHG
ECHO MV PRESSURE HALF TIME (PHT): 43.18 MS
ECHO MV REGURGITANT RADIUS PISA: 0.87 CM
ECHO MV REGURGITANT VOLUME: 39.77 ML
ECHO MV REGURGITANT VTIA: 125.61 CM
ECHO MV VTI: 19.19 CM
ECHO PV MAX VELOCITY: 123.48 CM/S
ECHO PV PEAK INSTANTANEOUS GRADIENT SYSTOLIC: 6.1 MMHG
ECHO PV REGURGITANT MAX VELOCITY: 170.24 CM/S
ECHO TV REGURGITANT MAX VELOCITY: 266.85 CM/S
ECHO TV REGURGITANT MAX VELOCITY: 492.44 CM/S
ECHO TV REGURGITANT PEAK GRADIENT: 28.53 MMHG
EOSINOPHIL # BLD: 0 K/UL (ref 0–0.4)
EOSINOPHIL # BLD: 0.4 K/UL (ref 0–0.4)
EOSINOPHIL NFR BLD: 0 % (ref 0–7)
EOSINOPHIL NFR BLD: 2 % (ref 0–7)
ERYTHROCYTE [DISTWIDTH] IN BLOOD BY AUTOMATED COUNT: 16.1 % (ref 11.5–14.5)
ERYTHROCYTE [DISTWIDTH] IN BLOOD BY AUTOMATED COUNT: 16.2 % (ref 11.5–14.5)
GLOBULIN SER CALC-MCNC: 3.4 G/DL (ref 2–4)
GLOBULIN SER CALC-MCNC: 4.1 G/DL (ref 2–4)
GLUCOSE SERPL-MCNC: 128 MG/DL (ref 65–100)
GLUCOSE SERPL-MCNC: 184 MG/DL (ref 65–100)
HCT VFR BLD AUTO: 32.4 % (ref 35–47)
HCT VFR BLD AUTO: 39.5 % (ref 35–47)
HGB BLD-MCNC: 10.7 G/DL (ref 11.5–16)
HGB BLD-MCNC: 12.5 G/DL (ref 11.5–16)
IMM GRANULOCYTES # BLD AUTO: 0 K/UL (ref 0–0.04)
IMM GRANULOCYTES # BLD AUTO: 0 K/UL (ref 0–0.04)
IMM GRANULOCYTES NFR BLD AUTO: 0 % (ref 0–0.5)
IMM GRANULOCYTES NFR BLD AUTO: 0 % (ref 0–0.5)
LACTATE SERPL-SCNC: 2.1 MMOL/L (ref 0.4–2)
LACTATE SERPL-SCNC: 3 MMOL/L (ref 0.4–2)
LVOT MG: 2.2 MMHG
LYMPHOCYTES # BLD: 0.7 K/UL (ref 0.8–3.5)
LYMPHOCYTES # BLD: 1.4 K/UL (ref 0.8–3.5)
LYMPHOCYTES NFR BLD: 7 % (ref 12–49)
LYMPHOCYTES NFR BLD: 8 % (ref 12–49)
MAGNESIUM SERPL-MCNC: 1.7 MG/DL (ref 1.6–2.4)
MAGNESIUM SERPL-MCNC: 1.7 MG/DL (ref 1.6–2.4)
MCH RBC QN AUTO: 26.7 PG (ref 26–34)
MCH RBC QN AUTO: 27.3 PG (ref 26–34)
MCHC RBC AUTO-ENTMCNC: 31.6 G/DL (ref 30–36.5)
MCHC RBC AUTO-ENTMCNC: 33 G/DL (ref 30–36.5)
MCV RBC AUTO: 82.7 FL (ref 80–99)
MCV RBC AUTO: 84.4 FL (ref 80–99)
MONOCYTES # BLD: 0 K/UL (ref 0–1)
MONOCYTES # BLD: 0.4 K/UL (ref 0–1)
MONOCYTES NFR BLD: 0 % (ref 5–13)
MONOCYTES NFR BLD: 4 % (ref 5–13)
MR PISA PV: 532.83 CM/S
NEUTS BAND NFR BLD MANUAL: 1 %
NEUTS BAND NFR BLD MANUAL: 4 %
NEUTS SEG # BLD: 16.3 K/UL (ref 1.8–8)
NEUTS SEG # BLD: 9.2 K/UL (ref 1.8–8)
NEUTS SEG NFR BLD: 85 % (ref 32–75)
NEUTS SEG NFR BLD: 89 % (ref 32–75)
NRBC # BLD: 0 K/UL (ref 0–0.01)
NRBC # BLD: 0 K/UL (ref 0–0.01)
NRBC BLD-RTO: 0 PER 100 WBC
NRBC BLD-RTO: 0 PER 100 WBC
PHOSPHATE SERPL-MCNC: 1.1 MG/DL (ref 2.6–4.7)
PHOSPHATE SERPL-MCNC: 1.7 MG/DL (ref 2.6–4.7)
PLATELET # BLD AUTO: 273 K/UL (ref 150–400)
PLATELET # BLD AUTO: 358 K/UL (ref 150–400)
PMV BLD AUTO: 10.1 FL (ref 8.9–12.9)
PMV BLD AUTO: 10.2 FL (ref 8.9–12.9)
POTASSIUM SERPL-SCNC: 3 MMOL/L (ref 3.5–5.1)
POTASSIUM SERPL-SCNC: 3.9 MMOL/L (ref 3.5–5.1)
PROT SERPL-MCNC: 5.4 G/DL (ref 6.4–8.2)
PROT SERPL-MCNC: 6.6 G/DL (ref 6.4–8.2)
RBC # BLD AUTO: 3.92 M/UL (ref 3.8–5.2)
RBC # BLD AUTO: 4.68 M/UL (ref 3.8–5.2)
RBC MORPH BLD: ABNORMAL
RBC MORPH BLD: ABNORMAL
SODIUM SERPL-SCNC: 138 MMOL/L (ref 136–145)
SODIUM SERPL-SCNC: 138 MMOL/L (ref 136–145)
WBC # BLD AUTO: 10.3 K/UL (ref 3.6–11)
WBC # BLD AUTO: 18.1 K/UL (ref 3.6–11)

## 2021-09-03 PROCEDURE — 87040 BLOOD CULTURE FOR BACTERIA: CPT

## 2021-09-03 PROCEDURE — 83735 ASSAY OF MAGNESIUM: CPT

## 2021-09-03 PROCEDURE — 84100 ASSAY OF PHOSPHORUS: CPT

## 2021-09-03 PROCEDURE — 71045 X-RAY EXAM CHEST 1 VIEW: CPT

## 2021-09-03 PROCEDURE — 74011250637 HC RX REV CODE- 250/637: Performed by: NURSE PRACTITIONER

## 2021-09-03 PROCEDURE — 74011000250 HC RX REV CODE- 250: Performed by: NURSE PRACTITIONER

## 2021-09-03 PROCEDURE — 83605 ASSAY OF LACTIC ACID: CPT

## 2021-09-03 PROCEDURE — 85025 COMPLETE CBC W/AUTO DIFF WBC: CPT

## 2021-09-03 PROCEDURE — 80053 COMPREHEN METABOLIC PANEL: CPT

## 2021-09-03 PROCEDURE — 74011250636 HC RX REV CODE- 250/636: Performed by: NURSE PRACTITIONER

## 2021-09-03 PROCEDURE — 74011250636 HC RX REV CODE- 250/636: Performed by: INTERNAL MEDICINE

## 2021-09-03 PROCEDURE — 93306 TTE W/DOPPLER COMPLETE: CPT

## 2021-09-03 PROCEDURE — 65610000006 HC RM INTENSIVE CARE

## 2021-09-03 PROCEDURE — 74011000258 HC RX REV CODE- 258: Performed by: NURSE PRACTITIONER

## 2021-09-03 PROCEDURE — C9113 INJ PANTOPRAZOLE SODIUM, VIA: HCPCS | Performed by: NURSE PRACTITIONER

## 2021-09-03 PROCEDURE — 36415 COLL VENOUS BLD VENIPUNCTURE: CPT

## 2021-09-03 RX ORDER — CEFEPIME HYDROCHLORIDE 1 G/1
2 INJECTION, POWDER, FOR SOLUTION INTRAMUSCULAR; INTRAVENOUS EVERY 8 HOURS
Status: DISCONTINUED | OUTPATIENT
Start: 2021-09-03 | End: 2021-09-03 | Stop reason: SDUPTHER

## 2021-09-03 RX ORDER — DIPHENHYDRAMINE HYDROCHLORIDE 50 MG/ML
25 INJECTION, SOLUTION INTRAMUSCULAR; INTRAVENOUS EVERY 6 HOURS
Status: COMPLETED | OUTPATIENT
Start: 2021-09-03 | End: 2021-09-03

## 2021-09-03 RX ORDER — NOREPINEPHRINE BITARTRATE/D5W 8 MG/250ML
.5-16 PLASTIC BAG, INJECTION (ML) INTRAVENOUS
Status: DISCONTINUED | OUTPATIENT
Start: 2021-09-03 | End: 2021-09-06

## 2021-09-03 RX ORDER — FENTANYL CITRATE 50 UG/ML
100 INJECTION, SOLUTION INTRAMUSCULAR; INTRAVENOUS
Status: DISCONTINUED | OUTPATIENT
Start: 2021-09-03 | End: 2021-09-03

## 2021-09-03 RX ORDER — VANCOMYCIN HYDROCHLORIDE
1250 EVERY 12 HOURS
Status: DISCONTINUED | OUTPATIENT
Start: 2021-09-04 | End: 2021-09-04

## 2021-09-03 RX ORDER — METRONIDAZOLE 500 MG/100ML
500 INJECTION, SOLUTION INTRAVENOUS EVERY 12 HOURS
Status: DISCONTINUED | OUTPATIENT
Start: 2021-09-03 | End: 2021-09-03

## 2021-09-03 RX ORDER — IPRATROPIUM BROMIDE AND ALBUTEROL SULFATE 2.5; .5 MG/3ML; MG/3ML
3 SOLUTION RESPIRATORY (INHALATION)
Status: DISCONTINUED | OUTPATIENT
Start: 2021-09-03 | End: 2021-09-08 | Stop reason: HOSPADM

## 2021-09-03 RX ORDER — FENTANYL CITRATE 50 UG/ML
50 INJECTION, SOLUTION INTRAMUSCULAR; INTRAVENOUS
Status: DISCONTINUED | OUTPATIENT
Start: 2021-09-03 | End: 2021-09-08 | Stop reason: HOSPADM

## 2021-09-03 RX ORDER — METOCLOPRAMIDE HYDROCHLORIDE 5 MG/ML
5 INJECTION INTRAMUSCULAR; INTRAVENOUS ONCE
Status: COMPLETED | OUTPATIENT
Start: 2021-09-03 | End: 2021-09-03

## 2021-09-03 RX ORDER — CLINDAMYCIN PHOSPHATE 900 MG/50ML
900 INJECTION INTRAVENOUS EVERY 8 HOURS
Status: DISCONTINUED | OUTPATIENT
Start: 2021-09-03 | End: 2021-09-03

## 2021-09-03 RX ORDER — POTASSIUM CHLORIDE 29.8 MG/ML
20 INJECTION INTRAVENOUS
Status: COMPLETED | OUTPATIENT
Start: 2021-09-03 | End: 2021-09-03

## 2021-09-03 RX ADMIN — FAMOTIDINE 20 MG: 10 INJECTION INTRAVENOUS at 01:15

## 2021-09-03 RX ADMIN — NOREPINEPHRINE BITARTRATE 10 MCG/MIN: 1 INJECTION, SOLUTION, CONCENTRATE INTRAVENOUS at 14:27

## 2021-09-03 RX ADMIN — FAMOTIDINE 20 MG: 10 INJECTION INTRAVENOUS at 08:11

## 2021-09-03 RX ADMIN — FENTANYL CITRATE 50 MCG: 50 INJECTION, SOLUTION INTRAMUSCULAR; INTRAVENOUS at 00:19

## 2021-09-03 RX ADMIN — SODIUM CHLORIDE, SODIUM LACTATE, POTASSIUM CHLORIDE, AND CALCIUM CHLORIDE 100 ML/HR: 600; 310; 30; 20 INJECTION, SOLUTION INTRAVENOUS at 11:31

## 2021-09-03 RX ADMIN — SODIUM CHLORIDE 40 MG: 9 INJECTION, SOLUTION INTRAMUSCULAR; INTRAVENOUS; SUBCUTANEOUS at 08:11

## 2021-09-03 RX ADMIN — Medication 10 ML: at 08:01

## 2021-09-03 RX ADMIN — ACETAMINOPHEN 650 MG: 325 TABLET ORAL at 16:22

## 2021-09-03 RX ADMIN — POTASSIUM CHLORIDE 20 MEQ: 400 INJECTION, SOLUTION INTRAVENOUS at 08:00

## 2021-09-03 RX ADMIN — Medication 10 ML: at 22:55

## 2021-09-03 RX ADMIN — SODIUM CHLORIDE, SODIUM LACTATE, POTASSIUM CHLORIDE, AND CALCIUM CHLORIDE 100 ML/HR: 600; 310; 30; 20 INJECTION, SOLUTION INTRAVENOUS at 23:54

## 2021-09-03 RX ADMIN — ENOXAPARIN SODIUM 40 MG: 40 INJECTION SUBCUTANEOUS at 08:11

## 2021-09-03 RX ADMIN — CLINDAMYCIN PHOSPHATE 900 MG: 900 INJECTION, SOLUTION INTRAVENOUS at 09:36

## 2021-09-03 RX ADMIN — METRONIDAZOLE 500 MG: 500 INJECTION, SOLUTION INTRAVENOUS at 02:05

## 2021-09-03 RX ADMIN — SODIUM CHLORIDE 40 MG: 9 INJECTION, SOLUTION INTRAMUSCULAR; INTRAVENOUS; SUBCUTANEOUS at 01:08

## 2021-09-03 RX ADMIN — ACETAMINOPHEN 650 MG: 325 TABLET ORAL at 21:42

## 2021-09-03 RX ADMIN — SODIUM CHLORIDE 40 MG: 9 INJECTION, SOLUTION INTRAMUSCULAR; INTRAVENOUS; SUBCUTANEOUS at 21:45

## 2021-09-03 RX ADMIN — DIPHENHYDRAMINE HYDROCHLORIDE 25 MG: 50 INJECTION, SOLUTION INTRAMUSCULAR; INTRAVENOUS at 05:19

## 2021-09-03 RX ADMIN — METOCLOPRAMIDE 5 MG: 5 INJECTION, SOLUTION INTRAMUSCULAR; INTRAVENOUS at 11:24

## 2021-09-03 RX ADMIN — SODIUM CHLORIDE 10 ML: 9 INJECTION, SOLUTION INTRAMUSCULAR; INTRAVENOUS; SUBCUTANEOUS at 21:48

## 2021-09-03 RX ADMIN — Medication 10 ML: at 15:36

## 2021-09-03 RX ADMIN — ONDANSETRON 4 MG: 2 INJECTION INTRAMUSCULAR; INTRAVENOUS at 10:08

## 2021-09-03 RX ADMIN — ONDANSETRON 4 MG: 2 INJECTION INTRAMUSCULAR; INTRAVENOUS at 00:01

## 2021-09-03 RX ADMIN — ACETAMINOPHEN 650 MG: 325 TABLET ORAL at 05:55

## 2021-09-03 RX ADMIN — SODIUM CHLORIDE, POTASSIUM CHLORIDE, SODIUM LACTATE AND CALCIUM CHLORIDE 1000 ML: 600; 310; 30; 20 INJECTION, SOLUTION INTRAVENOUS at 01:06

## 2021-09-03 RX ADMIN — POTASSIUM CHLORIDE 20 MEQ: 400 INJECTION, SOLUTION INTRAVENOUS at 02:22

## 2021-09-03 RX ADMIN — POTASSIUM PHOSPHATE, MONOBASIC AND POTASSIUM PHOSPHATE, DIBASIC: 224; 236 INJECTION, SOLUTION, CONCENTRATE INTRAVENOUS at 03:16

## 2021-09-03 RX ADMIN — DIPHENHYDRAMINE HYDROCHLORIDE 25 MG: 50 INJECTION, SOLUTION INTRAMUSCULAR; INTRAVENOUS at 11:23

## 2021-09-03 RX ADMIN — Medication 10 ML: at 01:07

## 2021-09-03 RX ADMIN — POTASSIUM CHLORIDE 20 MEQ: 400 INJECTION, SOLUTION INTRAVENOUS at 05:06

## 2021-09-03 RX ADMIN — DIPHENHYDRAMINE HYDROCHLORIDE 25 MG: 50 INJECTION, SOLUTION INTRAMUSCULAR; INTRAVENOUS at 18:18

## 2021-09-03 RX ADMIN — DIPHENHYDRAMINE HYDROCHLORIDE 25 MG: 50 INJECTION, SOLUTION INTRAMUSCULAR; INTRAVENOUS at 01:22

## 2021-09-03 RX ADMIN — CEFEPIME HYDROCHLORIDE 2 G: 2 INJECTION, POWDER, FOR SOLUTION INTRAVENOUS at 08:13

## 2021-09-03 RX ADMIN — LORAZEPAM 0.5 MG: 0.5 TABLET ORAL at 21:44

## 2021-09-03 RX ADMIN — ONDANSETRON 4 MG: 4 TABLET, ORALLY DISINTEGRATING ORAL at 21:44

## 2021-09-03 RX ADMIN — SODIUM CHLORIDE, SODIUM LACTATE, POTASSIUM CHLORIDE, AND CALCIUM CHLORIDE 100 ML/HR: 600; 310; 30; 20 INJECTION, SOLUTION INTRAVENOUS at 00:45

## 2021-09-03 RX ADMIN — CEFEPIME HYDROCHLORIDE 2 G: 2 INJECTION, POWDER, FOR SOLUTION INTRAVENOUS at 00:50

## 2021-09-03 RX ADMIN — NOREPINEPHRINE BITARTRATE 4 MCG/MIN: 1 INJECTION, SOLUTION, CONCENTRATE INTRAVENOUS at 01:04

## 2021-09-03 RX ADMIN — SODIUM CHLORIDE, SODIUM LACTATE, POTASSIUM CHLORIDE, AND CALCIUM CHLORIDE 1000 ML: 600; 310; 30; 20 INJECTION, SOLUTION INTRAVENOUS at 12:19

## 2021-09-03 RX ADMIN — FAMOTIDINE 20 MG: 10 INJECTION INTRAVENOUS at 21:40

## 2021-09-03 RX ADMIN — CEFEPIME HYDROCHLORIDE 2 G: 2 INJECTION, POWDER, FOR SOLUTION INTRAVENOUS at 16:22

## 2021-09-03 NOTE — PROGRESS NOTES
0700: Shift report received from Emil Knox RN.     0800: Shift assessment completed; see flowsheet for details. 1010: PRN zofran given for nausea. 1125: One-time dose of reglan given for persistent nausea. 1200: Reassessment; no changes. 1220: 1L LR bolus started per ID order. 1600: Reassessment; no changes. Continuing to wean levophed as tolerated. 1900: Shift report given to Tana Beltran RN.

## 2021-09-03 NOTE — H&P
[facility]    ICU TEAM Progress Note    Name: Solitario Joshi   : 1990   MRN: [id number]   Date: 9/3/2021      Subjective:   Progress Note: 9/3/2021      Reason for ICU Admission: sepsis     HPI:  Per OSH note:  28 y.o. female with a PMH of eczema who presents via EMS with rash, fever, nausea and vomiting. Patient states she was diagnosed with cellulitis about x1wk and started on Bactrim and Keflex. Pt then developed a rash 4 days ago and was then seen at [facility] yesterday for an allergic reaction likely from the Bactrim so they advised patient to stop the Bactrim and Keflex and started her on clindamycin. Patient states she is no better today and is having fever, weakness, nausea and vomiting x 2 days. LMP now. Patient states she was given Benadryl yesterday but has not taken any medication today. Accepted patient to ICU for continued Mx. On ICU admission, patient awake and alert, mild distress. c/o fevers, fatigue, nausea and vomiting. req levophed gtt to sustain hemodynamic stability. Active Problem List:     Problem List  Never Reviewed        Codes Class    Sepsis ([facility] [address] ICD-10-CM: A41.9  ICD-9-CM: 038.9, 995.91               Past Medical History:      has a past medical history of Eczema. Past Surgical History:      has a past surgical history that includes hx cholecystectomy (2013). Home Medications:     Prior to Admission medications    Medication Sig Start Date End Date Taking? Authorizing Provider   clindamycin (CLEOCIN) 300 mg capsule Take 300 mg by mouth four (4) times daily. Other, MD Luis Eduardo   triamcinolone acetonide (KENALOG) 0.1 % ointment Apply  to affected area two (2) times a day.  use thin layer 20   Catalina Farah PA-C   hydrOXYzine pamoate (VISTARIL) 25 mg capsule TOME ISABEL CAPSULA POR V?A ORAL CADA OCHO HORAS CUANDO SEA NECESARIO FOR ITCHING/SWELLING 9/10/19   Provider, Historical       Allergies/Social/Family History: Allergies   Allergen Reactions    Bactrim [Sulfamethoxazole-Trimethoprim] Rash      Social History     Tobacco Use    Smoking status: Current Every Day Smoker     Years: 3.00     Types: Cigarettes    Smokeless tobacco: Never Used    Tobacco comment: 2 cigarettes a day   Substance Use Topics    Alcohol use: Never      Family History   Problem Relation Age of Onset    Heart Disease Father     Breast Cancer Paternal Aunt     Diabetes Paternal Aunt        Review of Systems:     A comprehensive review of systems was negative except for that written in the HPI. Objective:   Vital Signs: There were no vitals taken for this visit. Temp (24hrs), Av.4 °F (38 °C), Min:98.6 °F (37 °C), Max:103.2 °F (39.6 °C)             Intake/Output:   No intake or output data in the 24 hours ending 21 0000    Physical Exam:    General:  Alert, cooperative, well noursished, well developed, appears stated age   Eyes:  Sclera anicteric. Pupils equally round and reactive to light. Mouth/Throat: Mucous membranes normal, oral pharynx clear   Neck: Supple   Lungs:   Clear to auscultation bilaterally, good effort   CV:  Tachycardic rate and rhythm,no murmur, click, rub or gallop   Abdomen:   Soft, non-tender.  bowel sounds normal. non-distended   Extremities: No cyanosis or edema   Skin: Skin color, texture, turgor normal. acute rash, scattered, whole body  Erythema to chest and shoulders, cool extremities B/L  Localized area of erythema to LLE   Lymph nodes: Cervical and supraclavicular normal   Musculoskeletal: No swelling or deformity   Lines/Devices:  Intact, no erythema, drainage or tenderness   Psych: Alert and oriented, normal mood affect given the setting       LABS AND  DATA: Personally reviewed  Recent Labs     21  1542   WBC 11.3*   HGB 12.7   HCT 38.8        Recent Labs     21  1542   *   K 3.4*      CO2 22   BUN 6   CREA 0.86   *   CA 7.7*     Recent Labs 21  1542   AP 57   TP 6.9   ALB 2.9*   GLOB 4.0   LPSE 77     No results for input(s): INR, PTP, APTT, INREXT, INREXT in the last 72 hours. No results for input(s): PHI, PCO2I, PO2I, FIO2I in the last 72 hours. No results for input(s): CPK, CKMB, TROIQ, BNPP in the last 72 hours. Hemodynamics:   PAP:   CO:     Wedge:   CI:     CVP:    SVR:       PVR:       Ventilator Settings:  Mode Rate Tidal Volume Pressure FiO2 PEEP                    Peak airway pressure:      Minute ventilation:          MEDS:   Reviewed    Chest X-Ray: personally reviewed and report checked    CXR Results  (Last 48 hours)               21 1623  XR CHEST PORT Final result    Impression:  No acute findings       Narrative:  EXAM: AP portable chest x-ray       INDICATION: Sepsis       COMPARISON: 2019       FINDINGS: A portable AP radiograph of the chest was obtained at 1615 hours. There is no pneumothorax or pleural effusion. The lungs are clear. The cardiac   and mediastinal contours and pulmonary vascularity are normal.  No hilar   enlargement is shown. No osseous abnormality is demonstrated. EK21  Sinus tachycardia   Nonspecific T wave abnormality   Abnormal ECG     ECHO:  none    Assessment:     ICU Problems:  Sepsis    ICU Comprehensive Plan of Care:   Plans for this Shift:   1. Sepsis/Cellulitis  a. LLE about 1 week ago  b. Seen and treated at OSH with Bactrim and Keflex  c. Developed whole body rash, stopped Abx  d. Presented to OSH #2, Received Clndamycin and Vancomycin  e. Leukocytosis 11.3, Lactate 1.5  f. Cont Abx, Vanco, Cefepime and Flagyl  g. IVF LR at 100 and 1L bolus  h. Currently req NE gtt for MAP, wean to MAP 65  i. ID consult in AM  2. Allergic reaction  a. Likely from Abx, Bactrim  b. Cont H2 blocker and Benadryl  3. Acute pain  a.  Fentanyl PRN    Multidisciplinary Rounds Completed:  Pending    ABCDEF Bundle/Checklist  Pain Medications: Fentanyl  Target RASS: 0 - Alert & Calm - Spontaneously pays attention to caregiver  Sedation Medications: None  CAM-ICU:  pending  Mobility: Up with assistance  PT/OT: pending   Restraints: None needed at this time  Discussed Plan of Care (goals of care): Pending  Addressed Code Status: Full Code    CARDIOVASCULAR  Cardiac Gtts: None  SBP Goal of: > 90 mmHg  MAP Goal of: > 65 mmHg  Transfusion Trigger (Hgb): <7 g/dL    RESPIRATORY  Vent Goals:   Head of bed > 30 degrees  Aggressive bronchopulmonary hygiene  Incentive spirometry  DVT Prophylaxis (if no, list reason): SCD's or Sequential Compression Device and Lovenox   SPO2 Goal: > 92%  Pulmonary toilet: Incentive Spirometry     GI/  Bush Catheter Present: No  GI Prophylaxis: Protonix (pantoprazole)   Nutrition: Pending NPO, ok for clears  IVFs: LR at 100  Bowel Movement: Pending  Bowel Regimen: MiraLax  Insulin: SSI PRN    ANTIBIOTICS  Antibiotics:  Skin Soft Tissue Infections --> Vancomycin and Zosyn; Vancomycin + Cefepime + Flagyl  Vancomycin    T/L/D  Tubes: None  Lines: Peripheral IV and Central Line  Drains: None    SPECIAL EQUIPMENT  None    DISPOSITION  Stay in ICU    CRITICAL CARE CONSULTANT NOTE  I had a face to face encounter with the patient, reviewed and interpreted patient data including clinical events, labs, images, vital signs, I/O's, and examined patient. I have discussed the case and the plan and management of the patient's care with the consulting services, the bedside nurses and the respiratory therapist.      NOTE OF PERSONAL INVOLVEMENT IN CARE   This patient has a high probability of imminent, clinically significant deterioration, which requires the highest level of preparedness to intervene urgently. I participated in the decision-making and personally managed or directed the management of the following life and organ supporting interventions that required my frequent assessment to treat or prevent imminent deterioration. I personally spent 60 minutes of critical care time. This is time spent at this critically ill patient's bedside actively involved in patient care as well as the coordination of care and discussions with the patient's family. This does not include any procedural time which has been billed separately.     MAXX Brunson  Advanced Practice Provider  [facility] Critical Care  9/3/2021

## 2021-09-03 NOTE — PROGRESS NOTES
Physician Progress Note      LISBETH Ramirez  CSN #:                  [id number]  :                       1990  ADMIT DATE:       2021 11:42 PM  [address] DATE:  RESPONDING  PROVIDER #:        Matt Adamson MD          QUERY TEXT:    Pt admitted with Sepsis. Pt noted to have concern for shock. If possible, please document in the progress notes and discharge summary if you are evaluating and/or treating any of the following: The medical record reflects the following:  Risk Factors: 28 y.o. female with a PMH of eczema who presents via EMS with rash, fever, nausea and vomiting. Patient states she was diagnosed with cellulitis about x1wk and started on Bactrim and Keflex. Scattered macular lesion of entire body, BP' trending low, IV fluid bolus x 3, vasopressors started    Clinical Indicators:  1955 ED [facility]  Developed erythematous rash 2 days ago feeling lightheaded today presented via EMS. Upon arrival, febrile and hypotensive. Despite 3 L of fluids, blood pressure trends down. Concern for shock, consider septic shock, allergic reaction, anaphylaxis. Less likely toxic shock syndrome    21 H&P: I am worried about this as Toxic shock syndrome    21 - 9/3/21  2203  0100- BP 84/38; 0125 BP 73/56; 0140 80/53  Levophed restarted at 4mcg. 0510- Oral temp 102.9. NP notified; order placed for paired blood cultures. Blood culture pending      Treatment: Tx higher level of care -[facility], Intensivist consult, I D consult ICU admission, LR 100ml/hr, Levophed titrate, Labs CBC, CMP, lactic, Mg, Phos, blood culture, ABX  cefepime, clindamycin vancomycin.   Options provided:  -- Septic Shock  -- Toxic Shock syndrome  -- Hypovolemia without Shock  -- Hypotension without Shock  -- Other - I will add my own diagnosis  -- Disagree - Not applicable / Not valid  -- Disagree - Clinically unable to determine / Unknown  -- Refer to Clinical Documentation Reviewer    PROVIDER RESPONSE TEXT:    This patient has Toxic Shock syndrome.     Query created by: Trae Arreguin on 9/3/2021 11:22 AM      Electronically signed by:  Do Osorio MD 9/3/2021 11:35 AM

## 2021-09-03 NOTE — PROGRESS NOTES
Pharmacy Antimicrobial Kinetic Dosing    Indication for Antimicrobials: SSTI , sepsis    Current Regimen of Each Antimicrobial:  Cefepime 2 gm IV q8h  (Start Date 9/3; Day # 1)  Metronidazole 500 mg IV q12h  (Start Date 9/3; Day # 1)  Vancomycin 2000 mg IV once, then 1250 mg IV q12h (Start Date 9/3; Day # 1)    Previous Antimicrobial Therapy:  Clindamycin 600 mg IV once (Start Date ; Day    Goal Level: AUC: 400-600 mg/hr/Liter/day    Date Dose & Interval Measured (mcg/mL) Extrapolated (mcg/mL)                       Date & time of next level:     Significant Positive Cultures:    blood: pending     Conditions for Dosing Consideration: None    Labs:  Recent Labs     21  1542   CREA 0.86   BUN 6     Recent Labs     21  1542   WBC 11.3*     Temp (24hrs), Av.4 °F (38 °C), Min:98.6 °F (37 °C), Max:103.2 °F (39.6 °C)      Creatinine Clearance (mL/min):   CrCl (Ideal Body Weight): 86.1   If actual weight < IBW: CrCl (Actual Body Weight) 119.9    Impression/Plan:   Vancomycin 2000 mg IV loading dose, followed by 1250 mg IV q12h (predicted trough 17.1 mcg/ml; )  Antimicrobial stop date 7 days for vancomycin and metronidazole, TBD for cefepime     Pharmacy will follow daily and adjust medications as appropriate for renal function and/or serum levels.     Thank you,  Roxana Herrera, ARELYD

## 2021-09-03 NOTE — PROGRESS NOTES
1900 - 2000  Bedside and Verbal shift change report given to Venice Foy (oncoming nurse) by Ketty Hussein (offgoing nurse). Report included the following information SBAR, Kardex, Procedure Summary, Intake/Output, MAR, Recent Results and Cardiac Rhythm SR / ST.     2000 - 2200  Shift assessment complete, alert / oriented X 4. Follows commands. Anxiety noted, high body temp / tachycardic / tachypneic on levophed low dose. PRN meds given, see MAR for details. Primary care nurse will continue to monitor. 2200 - 0000  Incontinence care done, gown and some linen changed. 0000 - 0200  Reassessment complete, no change. See flow sheet for details. 0200 - 0400  Bedpan / incontinence care done, assistance provided to turn self.    0400 - 0600  Reassessment complete / Admission  Database completed via  line. New allergy documentation ( LATEX ) placed in chart. 0600 - 0730  Bedpan / incontinence care provided. End of Shift Note    Bedside shift change report given to [place] (oncoming nurse) by Justin Reyes RN (offgoing nurse).   Report included the following information SBAR, Kardex, Intake/Output, MAR, Recent Results and Cardiac Rhythm ST    Shift worked:  Night     Shift summary and any significant changes:     None     Concerns for physician to address:  Fever / Rash     Zone phone for oncoming shift:   [phone]       Activity:  Activity Level: Bed Rest  Number times ambulated in hallways past shift: 0  Number of times OOB to chair past shift: 0    Cardiac:   Cardiac Monitoring: Yes      Cardiac Rhythm: Sinus Tachy    Access:   Current line(s): central line     Genitourinary:   Urinary status: voiding    Respiratory:   O2 Device: Nasal cannula  Chronic home O2 use?: NO  Incentive spirometer at bedside: NO     GI:  Last Bowel Movement Date:  (PTA)  Current diet:  ADULT DIET Full Liquid  DIET ONE TIME MESSAGE  Passing flatus: YES  Tolerating current diet: YES       Pain Management:   Patient states pain is manageable on current regimen: YES    Skin:  Anastacio Score: 17  Interventions: float heels    Patient Safety:  Fall Score:  Total Score: 3  Interventions: pt to call before getting OOB  High Fall Risk: Yes    Length of Stay:  Expected LOS: 4d 19h  Actual LOS: 222 Mathew Slade RN

## 2021-09-03 NOTE — PROGRESS NOTES
Patient seen and examined. Patient history obtained with . 71-year-old female with no remarkable past medical history presented to the ED for generalized malaise weakness nausea vomiting fevers that started few days ago after she was started on clindamycin by an outside hospital.  Patient reports that about a week ago she noticed pain in her left lower anterior shin and went to [facility] and was diagnosed with cellulitis. She was given Bactrim and Keflex. After multiple repetitions with the  in order to clarify this question patient said that she took and Keflex for 3 days. She reports no reaction to either of these antibiotics; she was not feeling sick she had no fevers and no rash with these antibiotics. She reported that her \" left leg cellulitis\" was not getting better with the same antibiotics therefore she went back to [facility] for 3 days of ~2 antibiotics. At [facility] they switched antibiotics to clindamycin 4 times a day. Patient reports that she took clindamycin for 5 days times a day. At some point after taking the clindamycin she started feeling sick overall, fevers, body wide rash. Patient is unable to articulate for me doses did please reaction start. She clearly denies any neck swelling tongue swelling shortness of breath. On the first day of taking the antibiotic she went back to [facility] because of the above symptoms symptoms. Patient reports that she was again prescribed Keflex at that point which she did not . She then decided to come to [facility] for better care. In the ED patient was noted to be febrile or 102 Fahrenheit. For the ICU physician overnight patient did get 3 L of fluid but remained with MAP less than 60. She was started on peripheral vasopressors and hence was admitted to the ICU  There was no remarkable leukocytosis on arrival; ~30, neutrophilic. There was no eosinophilia.   No acidosis, serum creatinine normal no LFT abnormality noted. Lactate was 3.1. Chest x-ray negative. Patient was started on vancomycin overnight and then cefepime and metronidazole added. Patient had received IM epinephrine in the ED. Clindamycin was started this morning for concern for toxic shock syndrome. Of note, patient is currently on her period,  Started 9/1/2021. She denies using any tampons. She does note a prior history of similar left leg cellulitis at the same location a few years ago. Patient reports that she is feeling much better since arrival yesterday. She notes some better managing her rash today. She is very nauseous. On her exam, patient is tired appearing and appears ill but nontoxic. The rash is macular over her face arms axilla dorsal, a bit fainter on the thighs and almost none on her shins. It is blanching. It is not painful. There is no area of fluctuance or swelling. There are no oral or mucosal lesions. Detailed social history was obtained from the patient as well. She has 3 kids at home and none of them have been sick recently. She is sexually active with one male partner for greater than 1 year. Originally from [facility] migrated to the [address] [place] [address] in 2015. Works in [employer]. No drug use now; former smoker. Assessment:  - Severe drug reaction with rash, systemic malaise. Per history this appears to be to be likely due to clindamycin. Not assessed to be anaphylaxis given patient tolerated multiple doses of clindamycin before she presented to the ED. Hypertension is not consistent with typical hypersensitivity Reactions (if not anaphylaxis), and possibly related to her dehydration. We recommend more IV fluids. Being weaned off of Levophed, from 15 to 12 at the moment. Clinically, patient is feeling better. Recommendations:  - Stop clindamycin.   - IV fluids  - Continue vancomycin, cefepime and flagyl empiric for now. - Follow blood cultures.    - Will also send HIV testing given her age, original place of migration. D/w Dr. Nataly Mirza. Full consult note to follow.        Kelli Carlton MD  Infectious Diseases

## 2021-09-04 LAB
ALBUMIN SERPL-MCNC: 1.6 G/DL (ref 3.5–5)
ALBUMIN/GLOB SERPL: 0.5 {RATIO} (ref 1.1–2.2)
ALP SERPL-CCNC: 55 U/L (ref 45–117)
ALT SERPL-CCNC: 98 U/L (ref 12–78)
ANION GAP SERPL CALC-SCNC: 9 MMOL/L (ref 5–15)
AST SERPL-CCNC: 127 U/L (ref 15–37)
ATRIAL RATE: 105 BPM
BASOPHILS # BLD: 0 K/UL (ref 0–0.1)
BASOPHILS NFR BLD: 0 % (ref 0–1)
BILIRUB DIRECT SERPL-MCNC: 0.2 MG/DL (ref 0–0.2)
BILIRUB SERPL-MCNC: 0.3 MG/DL (ref 0.2–1)
BUN SERPL-MCNC: 7 MG/DL (ref 6–20)
BUN/CREAT SERPL: 10 (ref 12–20)
CALCIUM SERPL-MCNC: 7.2 MG/DL (ref 8.5–10.1)
CALCULATED P AXIS, ECG09: 48 DEGREES
CALCULATED R AXIS, ECG10: 80 DEGREES
CALCULATED T AXIS, ECG11: 8 DEGREES
CHLORIDE SERPL-SCNC: 107 MMOL/L (ref 97–108)
CO2 SERPL-SCNC: 21 MMOL/L (ref 21–32)
CREAT SERPL-MCNC: 0.71 MG/DL (ref 0.55–1.02)
DATE LAST DOSE: NORMAL
DIAGNOSIS, 93000: NORMAL
DIFFERENTIAL METHOD BLD: ABNORMAL
EOSINOPHIL # BLD: 0 K/UL (ref 0–0.4)
EOSINOPHIL NFR BLD: 0 % (ref 0–7)
ERYTHROCYTE [DISTWIDTH] IN BLOOD BY AUTOMATED COUNT: 16.6 % (ref 11.5–14.5)
GLOBULIN SER CALC-MCNC: 3.5 G/DL (ref 2–4)
GLUCOSE SERPL-MCNC: 100 MG/DL (ref 65–100)
HCT VFR BLD AUTO: 35.4 % (ref 35–47)
HGB BLD-MCNC: 11.3 G/DL (ref 11.5–16)
HIV 1+2 AB+HIV1 P24 AG SERPL QL IA: NONREACTIVE
HIV12 RESULT COMMENT, HHIVC: NORMAL
IMM GRANULOCYTES # BLD AUTO: 0 K/UL (ref 0–0.04)
IMM GRANULOCYTES NFR BLD AUTO: 0 % (ref 0–0.5)
LACTATE SERPL-SCNC: 2.1 MMOL/L (ref 0.4–2)
LYMPHOCYTES # BLD: 0.7 K/UL (ref 0.8–3.5)
LYMPHOCYTES NFR BLD: 13 % (ref 12–49)
MAGNESIUM SERPL-MCNC: 1.6 MG/DL (ref 1.6–2.4)
MCH RBC QN AUTO: 26.7 PG (ref 26–34)
MCHC RBC AUTO-ENTMCNC: 31.9 G/DL (ref 30–36.5)
MCV RBC AUTO: 83.5 FL (ref 80–99)
MONOCYTES # BLD: 0.1 K/UL (ref 0–1)
MONOCYTES NFR BLD: 1 % (ref 5–13)
NEUTS BAND NFR BLD MANUAL: 6 %
NEUTS SEG # BLD: 4.9 K/UL (ref 1.8–8)
NEUTS SEG NFR BLD: 80 % (ref 32–75)
NRBC # BLD: 0 K/UL (ref 0–0.01)
NRBC BLD-RTO: 0 PER 100 WBC
P-R INTERVAL, ECG05: 124 MS
PHOSPHATE SERPL-MCNC: 1.2 MG/DL (ref 2.6–4.7)
PLATELET # BLD AUTO: 200 K/UL (ref 150–400)
PMV BLD AUTO: 10.3 FL (ref 8.9–12.9)
POTASSIUM SERPL-SCNC: 3.7 MMOL/L (ref 3.5–5.1)
PROT SERPL-MCNC: 5.1 G/DL (ref 6.4–8.2)
Q-T INTERVAL, ECG07: 310 MS
QRS DURATION, ECG06: 90 MS
QTC CALCULATION (BEZET), ECG08: 409 MS
RBC # BLD AUTO: 4.24 M/UL (ref 3.8–5.2)
RBC MORPH BLD: ABNORMAL
REPORTED DOSE,DOSE: NORMAL UNITS
REPORTED DOSE/TIME,TMG: NORMAL
SODIUM SERPL-SCNC: 137 MMOL/L (ref 136–145)
VANCOMYCIN TROUGH SERPL-MCNC: 6.4 UG/ML (ref 5–10)
VENTRICULAR RATE, ECG03: 105 BPM
WBC # BLD AUTO: 5.7 K/UL (ref 3.6–11)

## 2021-09-04 PROCEDURE — 65610000006 HC RM INTENSIVE CARE

## 2021-09-04 PROCEDURE — C9113 INJ PANTOPRAZOLE SODIUM, VIA: HCPCS | Performed by: NURSE PRACTITIONER

## 2021-09-04 PROCEDURE — 2709999900 HC NON-CHARGEABLE SUPPLY

## 2021-09-04 PROCEDURE — 83735 ASSAY OF MAGNESIUM: CPT

## 2021-09-04 PROCEDURE — 74011000250 HC RX REV CODE- 250: Performed by: NURSE PRACTITIONER

## 2021-09-04 PROCEDURE — 77010033678 HC OXYGEN DAILY

## 2021-09-04 PROCEDURE — 74011250636 HC RX REV CODE- 250/636: Performed by: NURSE PRACTITIONER

## 2021-09-04 PROCEDURE — 36415 COLL VENOUS BLD VENIPUNCTURE: CPT

## 2021-09-04 PROCEDURE — 80202 ASSAY OF VANCOMYCIN: CPT

## 2021-09-04 PROCEDURE — 85025 COMPLETE CBC W/AUTO DIFF WBC: CPT

## 2021-09-04 PROCEDURE — 83605 ASSAY OF LACTIC ACID: CPT

## 2021-09-04 PROCEDURE — 87389 HIV-1 AG W/HIV-1&-2 AB AG IA: CPT

## 2021-09-04 PROCEDURE — 80076 HEPATIC FUNCTION PANEL: CPT

## 2021-09-04 PROCEDURE — 84100 ASSAY OF PHOSPHORUS: CPT

## 2021-09-04 PROCEDURE — 74011250637 HC RX REV CODE- 250/637: Performed by: NURSE PRACTITIONER

## 2021-09-04 PROCEDURE — 74011000258 HC RX REV CODE- 258: Performed by: NURSE PRACTITIONER

## 2021-09-04 PROCEDURE — 80048 BASIC METABOLIC PNL TOTAL CA: CPT

## 2021-09-04 PROCEDURE — 99233 SBSQ HOSP IP/OBS HIGH 50: CPT | Performed by: STUDENT IN AN ORGANIZED HEALTH CARE EDUCATION/TRAINING PROGRAM

## 2021-09-04 RX ADMIN — CEFEPIME HYDROCHLORIDE 2 G: 2 INJECTION, POWDER, FOR SOLUTION INTRAVENOUS at 20:17

## 2021-09-04 RX ADMIN — SODIUM CHLORIDE 40 MG: 9 INJECTION, SOLUTION INTRAMUSCULAR; INTRAVENOUS; SUBCUTANEOUS at 20:20

## 2021-09-04 RX ADMIN — ENOXAPARIN SODIUM 40 MG: 40 INJECTION SUBCUTANEOUS at 08:23

## 2021-09-04 RX ADMIN — ONDANSETRON 4 MG: 4 TABLET, ORALLY DISINTEGRATING ORAL at 04:28

## 2021-09-04 RX ADMIN — FAMOTIDINE 20 MG: 10 INJECTION INTRAVENOUS at 20:19

## 2021-09-04 RX ADMIN — Medication 10 ML: at 22:08

## 2021-09-04 RX ADMIN — ACETAMINOPHEN 650 MG: 325 TABLET ORAL at 20:28

## 2021-09-04 RX ADMIN — LORAZEPAM 0.5 MG: 0.5 TABLET ORAL at 20:28

## 2021-09-04 RX ADMIN — ONDANSETRON 4 MG: 4 TABLET, ORALLY DISINTEGRATING ORAL at 20:28

## 2021-09-04 RX ADMIN — SODIUM CHLORIDE 40 MG: 9 INJECTION, SOLUTION INTRAMUSCULAR; INTRAVENOUS; SUBCUTANEOUS at 08:24

## 2021-09-04 RX ADMIN — Medication 10 ML: at 05:13

## 2021-09-04 RX ADMIN — FAMOTIDINE 20 MG: 10 INJECTION INTRAVENOUS at 08:23

## 2021-09-04 RX ADMIN — VANCOMYCIN HYDROCHLORIDE 1250 MG: 10 INJECTION, POWDER, LYOPHILIZED, FOR SOLUTION INTRAVENOUS at 21:55

## 2021-09-04 RX ADMIN — CEFEPIME HYDROCHLORIDE 2 G: 2 INJECTION, POWDER, FOR SOLUTION INTRAVENOUS at 08:16

## 2021-09-04 RX ADMIN — CEFEPIME HYDROCHLORIDE 2 G: 2 INJECTION, POWDER, FOR SOLUTION INTRAVENOUS at 01:45

## 2021-09-04 RX ADMIN — ACETAMINOPHEN 650 MG: 325 TABLET ORAL at 04:28

## 2021-09-04 RX ADMIN — LORAZEPAM 0.5 MG: 0.5 TABLET ORAL at 04:28

## 2021-09-04 RX ADMIN — SODIUM CHLORIDE, SODIUM LACTATE, POTASSIUM CHLORIDE, AND CALCIUM CHLORIDE 100 ML/HR: 600; 310; 30; 20 INJECTION, SOLUTION INTRAVENOUS at 10:25

## 2021-09-04 RX ADMIN — VANCOMYCIN HYDROCHLORIDE 1250 MG: 10 INJECTION, POWDER, LYOPHILIZED, FOR SOLUTION INTRAVENOUS at 10:25

## 2021-09-04 NOTE — PROGRESS NOTES
ICU Progress Note        Subjective: Overnight events noted. Used  to talk to the patient. Vital Signs:    Visit Vitals  BP (!) 104/55   Pulse (!) 124   Temp 98.9 °F (37.2 °C)   Resp 28   SpO2 95%       O2 Device: Nasal cannula   O2 Flow Rate (L/min): 2 l/min   Temp (24hrs), Av.8 °F (38.2 °C), Min:98.9 °F (37.2 °C), Max:102.8 °F (39.3 °C)       Intake/Output:   Last shift:      No intake/output data recorded. Last 3 shifts:  1901 -  0700  In: 6082.9 [I.V.:6082.9]  Out: 725 [Urine:725]    Intake/Output Summary (Last 24 hours) at 2021 0918  Last data filed at 2021 0500  Gross per 24 hour   Intake 3587.69 ml   Output 450 ml   Net 3137.69 ml       Ventilator Settings:                Physical Exam:    General: Alert, awake and oriented. Not in acute distress  HEENT:  Anicteric sclerae; pink palpebral conjunctivae; mucosa moist  Resp:  Symmetrical chest expansion, no accessory muscle use; good airway entry; no rales/ wheezing/ rhonchi noted  CV:  S1, S2 present; regular rate and rhythm  GI:  Abdomen soft, non-tender; (+) active bowel sounds  Extremities:  +2 pulses on all extremities; no edema/ cyanosis/ clubbing noted  Skin:  Warm; bilateral blanching rash noted, edema noted.    Neurologic:  Non-focal    DATA:     Current Facility-Administered Medications   Medication Dose Route Frequency    famotidine (PF) (PEPCID) 20 mg in 0.9% sodium chloride 10 mL injection  20 mg IntraVENous Q12H    fentaNYL citrate (PF) injection 50 mcg  50 mcg IntraVENous Q4H PRN    vancomycin (VANCOCIN) 1250 mg in  ml infusion  1,250 mg IntraVENous Q12H    cefepime (MAXIPIME) 2 g in 0.9% sodium chloride (MBP/ADV) 100 mL MBP  2 g IntraVENous Q8H    NOREPINephrine (LEVOPHED) 8 mg in 5% dextrose 250mL (32 mcg/mL) infusion  0.5-16 mcg/min IntraVENous TITRATE    albuterol-ipratropium (DUO-NEB) 2.5 MG-0.5 MG/3 ML  3 mL Nebulization Q6H PRN    sodium chloride (NS) flush 5-40 mL  5-40 mL IntraVENous Q8H    sodium chloride (NS) flush 5-40 mL  5-40 mL IntraVENous PRN    acetaminophen (TYLENOL) tablet 650 mg  650 mg Oral Q6H PRN    Or    acetaminophen (TYLENOL) suppository 650 mg  650 mg Rectal Q6H PRN    polyethylene glycol (MIRALAX) packet 17 g  17 g Oral DAILY PRN    ondansetron (ZOFRAN ODT) tablet 4 mg  4 mg Oral Q8H PRN    Or    ondansetron (ZOFRAN) injection 4 mg  4 mg IntraVENous Q6H PRN    enoxaparin (LOVENOX) injection 40 mg  40 mg SubCUTAneous DAILY    LORazepam (ATIVAN) tablet 0.5 mg  0.5 mg Oral Q6H PRN    pantoprazole (PROTONIX) 40 mg in 0.9% sodium chloride 10 mL injection  40 mg IntraVENous Q12H    lactated Ringers infusion  100 mL/hr IntraVENous CONTINUOUS         Labs: Results:       Chemistry Recent Labs     09/04/21 0418 09/03/21 0623 09/03/21 0033    128* 184*    138 138   K 3.7 3.9 3.0*    111* 108   CO2 21 19* 21   BUN 7 7 5*   CREA 0.71 0.87 0.86   CA 7.2* 6.7* 7.0*   AGAP 9 8 9   BUCR 10* 8* 6*   AP 55 62 61   TP 5.1* 5.4* 6.6   ALB 1.6* 2.0* 2.5*   GLOB 3.5 3.4 4.1*   AGRAT 0.5* 0.6* 0.6*      CBC w/Diff Recent Labs     09/04/21 0418 09/03/21 0623 09/03/21  0033   WBC 5.7 10.3 18.1*   RBC 4.24 3.92 4.68   HGB 11.3* 10.7* 12.5   HCT 35.4 32.4* 39.5    273 358   GRANS 80* 85* 89*   LYMPH 13 7* 8*   EOS 0 0 2      Coagulation No results for input(s): PTP, INR, APTT, INREXT in the last 72 hours. Liver Enzymes Recent Labs     09/04/21 0418   TP 5.1*   ALB 1.6*   AP 55      ABG No results found for: PH, PHI, PCO2, PCO2I, PO2, PO2I, HCO3, HCO3I, FIO2, FIO2I   Microbiology Recent Labs     09/02/21  1542   CULT NO GROWTH AFTER 12 HOURS        maging:  CXR Results  (Last 48 hours)               09/03/21 0537  XR CHEST PORT Final result    Impression:  No acute findings. Narrative:  EXAM: XR CHEST PORT       INDICATION: eval card and pulm changes       COMPARISON: Chest x-ray 9/2/2021.        FINDINGS: A portable AP radiograph of the chest was obtained at 04:51 hours. The   patient is on a cardiac monitor. The lungs are clear. The cardiac and   mediastinal contours and pulmonary vascularity are normal.  The bones and soft   tissues are grossly within normal limits. 09/02/21 1623  XR CHEST PORT Final result    Impression:  No acute findings       Narrative:  EXAM: AP portable chest x-ray       INDICATION: Sepsis       COMPARISON: 9/20/2019       FINDINGS: A portable AP radiograph of the chest was obtained at 1615 hours. There is no pneumothorax or pleural effusion. The lungs are clear. The cardiac   and mediastinal contours and pulmonary vascularity are normal.  No hilar   enlargement is shown. No osseous abnormality is demonstrated. CT Results  (Last 48 hours)    None               Impression:    The patient is a 28year old female with a PMH of eczema who presents via EMS with rash, fever, nausea and vomiting. Patient states she was diagnosed with cellulitis about x1wk and started on Bactrim and Keflex. Scattered macular lesion of entire body, BP' trending low, IV fluid bolus x 3, vasopressors started and transferred to [facility] for ICU admission. Shock - likely septic but could have cardiac component in it. Cultures negative to date. Cont. Antibiotics per ID. She is off levophed gtt. ECHO showed EF down to 40% with moderate MR. I will get cardiology consult. HIV test pending. At this time left leg cellulitis is thought to be the source. WBC has normalized. Fever/rash - could be infectious but autoimmune disease could not be ruled out. The rash is blanching, non-itchy, no blisters. No joint pain although she has both upper and lower extremity edema. No ulcers in mouth or in perineal area. I will send autoimmune workup. [facility] time - 40 minutes.      Bunny Arteaga MD, FCCP, ATSF, FACP, DAABIP  Interventional Pulmonology/Critical [address]

## 2021-09-04 NOTE — PROGRESS NOTES
Infectious Disease Progress Note         Interval:  Off pressors as of this morning  Febrile this morning    Subjective:   Seen in f/u today. Examined with interpretor services. Patient reports she was sleeping and did not feel the fever this morning. Overall reports to be feeling better today than yesterday. Reports she thinks that the rash is improving as well. Still has nausea. Objective:    Vitals:   Reviewed in chart. Physical Exam:  Gen: No apparent distress, appears sick but not toxic. Appears more comfortable than yesterday. HEENT:  Normocephalic, atraumatic, no scleral icterus, no oral or mucosal lesions, ulcerations or peeling. CV: off pressors   Lungs: on NC  Abdomen: soft, non tender, non distended  Genitourinary:    goldman catheter   Skin: rash is coalescing on the torso. Some fading on legs. There is a peeled reagin at the site of the prior left EJ. Mild blistering on inner side of thighs. Psych: good affect, good eye contact, non tearful  Neuro: alert, oriented to time,  place, and situation, moves all extremities to commands, verbal  Musculoskeletal:  No joint edema, erythema or tenderness noted   Lines: right femoral        Labs:  Recent Results (from the past 24 hour(s))   ECHO ADULT COMPLETE    Collection Time: 09/03/21  2:29 PM   Result Value Ref Range    IVSd 1.00 (A) 0.60 - 0.90 cm    LVIDd 4.76 3.90 - 5.30 cm    LVIDs 4.27 cm    LVOT d 1.60 cm    LVPWd 1.02 (A) 0.60 - 0.90 cm    LV Ejection Fraction MOD 4C 37 percent    LV ED Vol A4C 144. 27 mL    LV ES Vol A4C 90.95 mL    LVOT Peak Gradient 4.12 mmHg    Left Ventricular Outflow Tract Mean Gradient 2.20 mmHg    LVOT SV 35.2 mL    LVOT Peak Velocity 101.53 cm/s    LVOT VTI 17.45 cm    Left Atrium Major Axis 3.19 cm    LA Area 4C 18.65 cm2    LA Vol 4C 54.71 (A) 22.00 - 52.00 mL    Aortic Valve Area by Continuity of Peak Velocity 1.54 cm2    Aortic Valve Area by Continuity of VTI 1.86 cm2    AoV PG 7.05 mmHg    Aortic Valve Systolic Mean Gradient 9.98 mmHg    Aortic Valve Systolic Peak Velocity 797.61 cm/s    AoV VTI 18.93 cm    PISA MR Rad 0.87 cm    MV A Balta 54.16 cm/s    Mitral Valve E Wave Deceleration Time 158.74 ms    MV E Balta 94.53 cm/s    E/E' ratio (averaged) 6.53     E/E' lateral 5.69     E/E' septal 7.36     LV E' Lateral Velocity 16.60 cm/s    LV E' Septal Velocity 12.85 cm/s    MVA VTI 1.84 cm2    Mitral Effective Regurgitant Orifice Area 0.32 cm2    MV regurgitant volume 39.77 mL    TR Max Velocity 492.44 cm/s    MV Peak Gradient 4.42 mmHg    MV Mean Gradient 1.68 mmHg    Mitral Valve Pressure Half-time 43.18 ms    Mitral Valve Max Velocity 105.11 cm/s    Mitral Valve Annulus Velocity Time Integral 19.19 cm    MVA (PHT) 5.09 cm2    Mitral Regurgitant PISA Peak Velocity 532.83 cm/s    Mitral Regurgitant Velocity Time Integral 125.61 cm    Pulmonic Regurgitant End Max Velocity 170.24 cm/s    Pulmonic Valve Systolic Peak Instantaneous Gradient 6.10 mmHg    Pulmonic Valve Max Velocity 123.48 cm/s    Triscuspid Valve Regurgitation Peak Gradient 28.53 mmHg    TR Max Velocity 266.85 cm/s    Ao Root D 1.82 cm    MV E/A 1.75     LV Mass .2 67.0 - 162.0 g    LV Mass AL Index 91.0 43.0 - 95.0 g/m2    Left Atrium Minor Axis 1.71 cm    LA Vol Index 29.26 16.00 - 28.00 ml/m2    LVED Vol Index A4C 77.1 mL/m2    LVES Vol Index A4C 48.6 mL/m2    XU/BSA Pk Balta 0.8 cm2/m2    XU/BSA VTI 1.0 JW0/R7   METABOLIC PANEL, BASIC    Collection Time: 09/04/21  4:18 AM   Result Value Ref Range    Sodium 137 136 - 145 mmol/L    Potassium 3.7 3.5 - 5.1 mmol/L    Chloride 107 97 - 108 mmol/L    CO2 21 21 - 32 mmol/L    Anion gap 9 5 - 15 mmol/L    Glucose 100 65 - 100 mg/dL    BUN 7 6 - 20 MG/DL    Creatinine 0.71 0.55 - 1.02 MG/DL    BUN/Creatinine ratio 10 (L) 12 - 20      GFR est AA >60 >60 ml/min/1.73m2    GFR est non-AA >60 >60 ml/min/1.73m2    Calcium 7.2 (L) 8.5 - 10.1 MG/DL   CBC WITH AUTOMATED DIFF    Collection Time: 09/04/21 4:18 AM   Result Value Ref Range    WBC 5.7 3.6 - 11.0 K/uL    RBC 4.24 3.80 - 5.20 M/uL    HGB 11.3 (L) 11.5 - 16.0 g/dL    HCT 35.4 35.0 - 47.0 %    MCV 83.5 80.0 - 99.0 FL    MCH 26.7 26.0 - 34.0 PG    MCHC 31.9 30.0 - 36.5 g/dL    RDW 16.6 (H) 11.5 - 14.5 %    PLATELET 535 437 - 069 K/uL    MPV 10.3 8.9 - 12.9 FL    NRBC 0.0 0  WBC    ABSOLUTE NRBC 0.00 0.00 - 0.01 K/uL    NEUTROPHILS 80 (H) 32 - 75 %    BAND NEUTROPHILS 6 %    LYMPHOCYTES 13 12 - 49 %    MONOCYTES 1 (L) 5 - 13 %    EOSINOPHILS 0 0 - 7 %    BASOPHILS 0 0 - 1 %    IMMATURE GRANULOCYTES 0 0.0 - 0.5 %    ABS. NEUTROPHILS 4.9 1.8 - 8.0 K/UL    ABS. LYMPHOCYTES 0.7 (L) 0.8 - 3.5 K/UL    ABS. MONOCYTES 0.1 0.0 - 1.0 K/UL    ABS. EOSINOPHILS 0.0 0.0 - 0.4 K/UL    ABS. BASOPHILS 0.0 0.0 - 0.1 K/UL    ABS. IMM. GRANS. 0.0 0.00 - 0.04 K/UL    DF AUTOMATED      RBC COMMENTS ANISOCYTOSIS  1+       LACTIC ACID    Collection Time: 09/04/21  4:18 AM   Result Value Ref Range    Lactic acid 2.1 (HH) 0.4 - 2.0 MMOL/L   MAGNESIUM    Collection Time: 09/04/21  4:18 AM   Result Value Ref Range    Magnesium 1.6 1.6 - 2.4 mg/dL   PHOSPHORUS    Collection Time: 09/04/21  4:18 AM   Result Value Ref Range    Phosphorus 1.2 (L) 2.6 - 4.7 MG/DL   HIV 1/2 AG/AB, 4TH GENERATION,W RFLX CONFIRM    Collection Time: 09/04/21  4:18 AM   Result Value Ref Range    HIV 1/2 Interpretation NONREACTIVE NR      HIV 1/2 result comment SEE NOTE     HEPATIC FUNCTION PANEL    Collection Time: 09/04/21  4:18 AM   Result Value Ref Range    Protein, total 5.1 (L) 6.4 - 8.2 g/dL    Albumin 1.6 (L) 3.5 - 5.0 g/dL    Globulin 3.5 2.0 - 4.0 g/dL    A-G Ratio 0.5 (L) 1.1 - 2.2      Bilirubin, total 0.3 0.2 - 1.0 MG/DL    Bilirubin, direct 0.2 0.0 - 0.2 MG/DL    Alk. phosphatase 55 45 - 117 U/L    AST (SGOT) 127 (H) 15 - 37 U/L    ALT (SGPT) 98 (H) 12 - 78 U/L               Assessment:  - Severe drug reaction with rash, systemic malaise.   Per history this appears to be to be likely due to clindamycin. Not assessed to be anaphylaxis given patient tolerated multiple doses of clindamycin before she presented to the ED. Hypotension on arrival likely due to the inflammatory response of the drug rash and dehydration. 9/4/21: Clinically, patient reporting to feel better than yesterday. More comfortable on exam, more awake. Rash is not worsening today. Not assessed to be EM/SJS/TEN. Mild blistering (likely early-stage) on inner thigh of legs; not assessed to be worsening of the rash. She is off pressors as of this morning. HIV negative. Recommendations:  - Continue vancomycin, cefepime and flagyl empiric for now. - Follow blood cultures. - Autoimmune work up being sent by ICU team.         Will follow. Thank you for the opportunity to participate in the care of this patient. Please contact with questions or concerns.       Kris Siegel MD  Infectious Diseases

## 2021-09-04 NOTE — PROGRESS NOTES
0730: Shift report received from [address] [place] [address]  [phone]:  Consult to cardiology called, and returned at this time. Dr. Juvenal Thomas.

## 2021-09-05 LAB
ANION GAP SERPL CALC-SCNC: 5 MMOL/L (ref 5–15)
BUN SERPL-MCNC: 7 MG/DL (ref 6–20)
BUN/CREAT SERPL: 11 (ref 12–20)
CALCIUM SERPL-MCNC: 7.3 MG/DL (ref 8.5–10.1)
CHLORIDE SERPL-SCNC: 105 MMOL/L (ref 97–108)
CK SERPL-CCNC: 199 U/L (ref 26–192)
CO2 SERPL-SCNC: 26 MMOL/L (ref 21–32)
COMMENT, HOLDF: NORMAL
CREAT SERPL-MCNC: 0.63 MG/DL (ref 0.55–1.02)
GLUCOSE SERPL-MCNC: 84 MG/DL (ref 65–100)
LACTATE SERPL-SCNC: 1.2 MMOL/L (ref 0.4–2)
MAGNESIUM SERPL-MCNC: 1.9 MG/DL (ref 1.6–2.4)
PHOSPHATE SERPL-MCNC: 1.8 MG/DL (ref 2.6–4.7)
POTASSIUM SERPL-SCNC: 3.6 MMOL/L (ref 3.5–5.1)
SAMPLES BEING HELD,HOLD: NORMAL
SODIUM SERPL-SCNC: 136 MMOL/L (ref 136–145)

## 2021-09-05 PROCEDURE — 65660000000 HC RM CCU STEPDOWN

## 2021-09-05 PROCEDURE — 74011250637 HC RX REV CODE- 250/637: Performed by: NURSE PRACTITIONER

## 2021-09-05 PROCEDURE — 83735 ASSAY OF MAGNESIUM: CPT

## 2021-09-05 PROCEDURE — 36415 COLL VENOUS BLD VENIPUNCTURE: CPT

## 2021-09-05 PROCEDURE — 86256 FLUORESCENT ANTIBODY TITER: CPT

## 2021-09-05 PROCEDURE — 84100 ASSAY OF PHOSPHORUS: CPT

## 2021-09-05 PROCEDURE — 74011000258 HC RX REV CODE- 258: Performed by: NURSE PRACTITIONER

## 2021-09-05 PROCEDURE — 74011250636 HC RX REV CODE- 250/636: Performed by: NURSE PRACTITIONER

## 2021-09-05 PROCEDURE — 80048 BASIC METABOLIC PNL TOTAL CA: CPT

## 2021-09-05 PROCEDURE — 86038 ANTINUCLEAR ANTIBODIES: CPT

## 2021-09-05 PROCEDURE — 82550 ASSAY OF CK (CPK): CPT

## 2021-09-05 PROCEDURE — 99223 1ST HOSP IP/OBS HIGH 75: CPT | Performed by: INTERNAL MEDICINE

## 2021-09-05 PROCEDURE — 83605 ASSAY OF LACTIC ACID: CPT

## 2021-09-05 PROCEDURE — 74011000250 HC RX REV CODE- 250: Performed by: NURSE PRACTITIONER

## 2021-09-05 PROCEDURE — 77010033678 HC OXYGEN DAILY

## 2021-09-05 PROCEDURE — C9113 INJ PANTOPRAZOLE SODIUM, VIA: HCPCS | Performed by: NURSE PRACTITIONER

## 2021-09-05 RX ADMIN — LORAZEPAM 0.5 MG: 0.5 TABLET ORAL at 12:16

## 2021-09-05 RX ADMIN — SODIUM CHLORIDE 40 MG: 9 INJECTION, SOLUTION INTRAMUSCULAR; INTRAVENOUS; SUBCUTANEOUS at 22:17

## 2021-09-05 RX ADMIN — SODIUM CHLORIDE 40 MG: 9 INJECTION, SOLUTION INTRAMUSCULAR; INTRAVENOUS; SUBCUTANEOUS at 09:40

## 2021-09-05 RX ADMIN — ACETAMINOPHEN 650 MG: 325 TABLET ORAL at 23:18

## 2021-09-05 RX ADMIN — FAMOTIDINE 20 MG: 10 INJECTION INTRAVENOUS at 22:28

## 2021-09-05 RX ADMIN — CEFEPIME HYDROCHLORIDE 2 G: 2 INJECTION, POWDER, FOR SOLUTION INTRAVENOUS at 09:31

## 2021-09-05 RX ADMIN — VANCOMYCIN HYDROCHLORIDE 1000 MG: 1 INJECTION, POWDER, LYOPHILIZED, FOR SOLUTION INTRAVENOUS at 03:13

## 2021-09-05 RX ADMIN — Medication 10 ML: at 14:00

## 2021-09-05 RX ADMIN — VANCOMYCIN HYDROCHLORIDE 1000 MG: 1 INJECTION, POWDER, LYOPHILIZED, FOR SOLUTION INTRAVENOUS at 22:18

## 2021-09-05 RX ADMIN — CEFEPIME HYDROCHLORIDE 2 G: 2 INJECTION, POWDER, FOR SOLUTION INTRAVENOUS at 02:07

## 2021-09-05 RX ADMIN — CEFEPIME HYDROCHLORIDE 2 G: 2 INJECTION, POWDER, FOR SOLUTION INTRAVENOUS at 17:40

## 2021-09-05 RX ADMIN — Medication 10 ML: at 22:27

## 2021-09-05 RX ADMIN — ENOXAPARIN SODIUM 40 MG: 40 INJECTION SUBCUTANEOUS at 09:36

## 2021-09-05 RX ADMIN — FAMOTIDINE 20 MG: 10 INJECTION INTRAVENOUS at 09:40

## 2021-09-05 RX ADMIN — VANCOMYCIN HYDROCHLORIDE 1000 MG: 1 INJECTION, POWDER, LYOPHILIZED, FOR SOLUTION INTRAVENOUS at 12:16

## 2021-09-05 NOTE — PROGRESS NOTES
1900 - 2000  Bedside and Verbal shift change report given to Rajinder Vizcaino (oncoming nurse) by [place] (offgoing nurse). Report included the following information SBAR, Kardex, Intake/Output, MAR, Recent Results and Cardiac Rhythm ST.     2000 - 2200  Shift assessment complete, A/O X 3. Skin flushed / red / hot / hypersensitive. Fever noted, prn meds given. See MAR for details, will continue to monitor. Vascular team @ bedside trying to place an IV access. [address] provided to the bedside commode. 0000 - 0200  Reassessment complete, no change. 0200 - 0400  Incontinence care done with soap / water, gown / linen etc ... changed. IV access  # 22 G on her RA placed by VX team became painful and difficult to flush / Cathleen Peraza. RN and Lidia NEAL NP attempted to insert an access and draw labs,unsuccessful.    0400 - 0600  Reassessment complete, no change. 0600 - 0800  Resting quietly,  End of Shift Note    Bedside shift change report given to [place] (oncoming nurse) by Neno Pyle RN (offgoing nurse). Report included the following information SBAR, Kardex, Intake/Output, MAR and Cardiac Rhythm SR    Shift worked:  Night     Shift summary and any significant changes:     None     Concerns for physician to address:  IV access     Zone phone for oncoming shift:   [phone]       Activity:  Activity Level:  Up with Assistance  Number times ambulated in hallways past shift: 1  Number of times OOB to chair past shift: 1    Cardiac:   Cardiac Monitoring: Yes      Cardiac Rhythm: Sinus Rhythm, Sinus Tachy    Access:   Current line(s): no access     Genitourinary:   Urinary status: voiding    Respiratory:   O2 Device: Nasal cannula  Chronic home O2 use?: NO  Incentive spirometer at bedside: NO     GI:  Last Bowel Movement Date:  (PTA)  Current diet:  ADULT DIET Full Liquid  DIET ONE TIME MESSAGE  Passing flatus: YES  Tolerating current diet: YES       Pain Management:   Patient states pain is manageable on current regimen: YES    Skin:  Anastacio Score: 16  Interventions: increase time out of bed    Patient Safety:  Fall Score:  Total Score: 2  Interventions: pt to call before getting OOB  High Fall Risk: Yes    Length of Stay:  Expected LOS: 4d 19h  Actual LOS: [address], RN

## 2021-09-05 NOTE — PROGRESS NOTES
Hospitalist Progress Note    NAME: David Shock   :  1990   MRN:  [id number]     Interim Hospital Summary: [id number] Ramiro Reilly y.o. female whom presented on 2021 with rash, fever, nausea and vomiting. Patient was diagnosed 1 week PTA with cellulitis and treated with Bactrim and Keflex. Patient developed a rash and presented to [facility]. The rash was felt to be an allergic reaction possibly from Bactrim so the patient was switched to clindamycin. With no improvement, she will she presents to [facility] and subsequently admitted by [address] [place] [address] team for shock and possible allergic reaction. Initially started on vancomycin/cefepime/Flagyl and Levophed support. Vasopressors have been weaned off . Generalized rash is improving, autoimmune work-up sent. Echocardiogram demonstrates new cardiomyopathy EF 45%. Cardiology recommends outpatient evaluation. Transfer out of ICU initiated . Infectious disease following.      Assessment / Plan:    Shock, resolved  Cellulitis, left lower extremity  Lactic acidosis, resolved  Leukocytosis, resolved  -Per chart review: Initial hypotension/shock felt to be related to combination of sepsis and component due to inflammatory response from drug reaction and perhaps some volume depletion as well  -Blood cultures NGTD  -Weaned off vasopressors   -No stress steroids given  -Continue IV cefepime and vancomycin.   -Infectious disease input appreciated  -Discussed with intensivist. Patient felt stable to be transferred out of ICU     Generalized rash   Hx Eczema   -suspected to be drug reaction from clindamycin but autoimmune disease could not be ruled out-work-up sent in this pending    Cardiomyopathy, EF 45-50%  Moderate MR  -Intensivist discussed with on-call cardiology (Dr. Olga Santana), recommend can follow-up as outpatient to complete work-up    Significant peripheral edema  -likely multifactorial from initial volume received, the systemic drug reaction and perhaps her underlying CM as well.   -she has been off vasopressors since 9/03 so could consider diuresing but given her allergic reaction to bactrim, I would hold off on starting diuretic today. Re assess going forward. Hyponatremia resolved  Hypokalemia   Hypophosphatemia  -replete prn      Estimated discharge date: September 8  Barriers:    Code status: Full  Prophylaxis: Lovenox  Recommended Disposition: Home w/Family       Subjective:     Chief Complaint / Reason for Physician Visit  Follow up of shock, sepsis, cellulitis, cardiomyopathy, rash  Chart reviewed in detail. Discussed with RN events overnight. Complaining of generalized swelling     Review of Systems:  Symptom Y/N Comments  Symptom Y/N Comments   Fever/Chills    Chest Pain     Poor Appetite    Edema y    Cough    Abdominal Pain     Sputum    Joint Pain     SOB/EAGLE    Pruritis/Rash     Nausea/vomit    Tolerating PT/OT     Diarrhea    Tolerating Diet     Constipation    Other       Could NOT obtain due to:      PO intake: No data found. Objective:     VITALS:   Last 24hrs VS reviewed since prior progress note.  Most recent are:  Patient Vitals for the past 24 hrs:   Temp Pulse Resp BP SpO2   09/05/21 1000 -- (!) 103 28 (!) 143/79 100 %   09/05/21 0900 99.1 °F (37.3 °C) (!) 109 (!) 44 (!) 129/59 98 %   09/05/21 0700 -- (!) 104 29 107/68 98 %   09/05/21 0600 -- (!) 102 28 (!) 106/58 98 %   09/05/21 0500 -- (!) 109 25 115/67 98 %   09/05/21 0400 98.7 °F (37.1 °C) (!) 101 (!) 31 (!) 103/58 97 %   09/05/21 0300 -- (!) 101 27 -- 99 %   09/05/21 0200 -- (!) 102 28 108/66 99 %   09/05/21 0100 -- (!) 102 26 111/64 --   09/05/21 0000 99.4 °F (37.4 °C) (!) 101 (!) 31 106/62 96 %   09/04/21 2300 -- (!) 120 (!) 33 -- --   09/04/21 2200 -- (!) 123 (!) 36 -- --   09/04/21 2100 -- (!) 118 (!) 32 (!) 113/46 --   09/04/21 2000 (!) 101.8 °F (38.8 °C) (!) 119 (!) 40 102/63 90 %   09/04/21 1939 -- (!) 115 28 132/74 -- 09/04/21 1936 -- (!) 116 29 122/77 --   09/04/21 1600 99 °F (37.2 °C) (!) 115 29 103/66 96 %   09/04/21 1500 -- (!) 112 (!) 34 120/61 98 %   09/04/21 1300 -- (!) 110 (!) 44 109/73 97 %   09/04/21 1200 -- (!) 114 30 109/75 97 %   09/04/21 1147 98.5 °F (36.9 °C) -- -- -- --   09/04/21 1100 -- (!) 116 (!) 41 109/73 --   09/04/21 1033 -- (!) 119 (!) 39 110/65 --       Intake/Output Summary (Last 24 hours) at 9/5/2021 1006  Last data filed at 9/5/2021 0207  Gross per 24 hour   Intake 1800 ml   Output 550 ml   Net 1250 ml        I had a face to face encounter, and independently examined this patient on 9/5/2021, as outlined below:  PHYSICAL EXAM:  General: WD, WN. Alert, cooperative, no acute distress    EENT:  EOMI. Anicteric sclerae. MMM  No stridor  Resp:  CTA bilaterally, no wheezing or rales. No accessory muscle use  CV:  Regular  rhythm,  + diffuse ext edema  GI:  Soft, Non distended, Non tender. +Bowel sounds  Neurologic:  Alert and oriented X 3, normal speech,   Psych:   Good insight. Not anxious nor agitated  Skin:  + generalized macular rash of trunk and all ext. Reviewed most current lab test results and cultures  YES  Reviewed most current radiology test results   YES  Review and summation of old records today    NO  Reviewed patient's current orders and MAR    YES  PMH/SH reviewed - no change compared to H&P  ________________________________________________________________________  Care Plan discussed with:    Comments   Patient x Via    Family      RN x    Care Manager     Consultant  x  intensivist                     Multidiciplinary team rounds were held today with , nursing, pharmacist and clinical coordinator. Patient's plan of care was discussed; medications were reviewed and discharge planning was addressed.      ________________________________________________________________________  Total NON critical care TIME:  35   Minutes    Total CRITICAL CARE TIME Spent: Minutes non procedure based      Comments   >50% of visit spent in counseling and coordination of care x     This includes time during multidisciplinary rounds if indicated above   ________________________________________________________________________  Donald Horta MD     Procedures: see electronic medical records for all procedures/Xrays and details which were not copied into this note but were reviewed prior to creation of Plan. LABS:  I reviewed today's most current labs and imaging studies. Pertinent labs include:  Recent Labs     09/04/21 0418 09/03/21 0623 09/03/21 0033   WBC 5.7 10.3 18.1*   HGB 11.3* 10.7* 12.5   HCT 35.4 32.4* 39.5    273 358     Recent Labs     09/05/21  0842 09/04/21 0418 09/03/21 0623 09/03/21 0033 09/03/21  0033    137 138   < > 138   K 3.6 3.7 3.9   < > 3.0*    107 111*   < > 108   CO2 26 21 19*   < > 21   GLU 84 100 128*   < > 184*   BUN 7 7 7   < > 5*   CREA 0.63 0.71 0.87   < > 0.86   CA 7.3* 7.2* 6.7*   < > 7.0*   MG 1.9 1.6 1.7   < > 1.7   PHOS 1.8* 1.2* 1.7*   < > 1.1*   ALB  --  1.6* 2.0*  --  2.5*   TBILI  --  0.3 0.3  --  0.5   ALT  --  98* 46  --  29    < > = values in this interval not displayed.

## 2021-09-05 NOTE — PROGRESS NOTES
Pharmacy Antimicrobial Kinetic Dosing      Vancomycin trough Goal Level: AUC: 400-600 mg/hr/Liter/day    Date Dose & Interval Measured (mcg/mL) Extrapolated (mcg/mL)   9/4 20:31 1250 mg q12h 6.4 7.7                   Impression/Plan:   The vancomycin trough resulted at 6.7 mcg/ml. Low level a result of doses missed on 9/3 due to incorrect vancomycin . Dose adjusted to 1000 mg IV q8h for a predicted AUC of 495     Pharmacy will follow daily and adjust medications as appropriate for renal function and/or serum levels.     Thank you,  Rosaline Osler, PHARMD

## 2021-09-05 NOTE — PROGRESS NOTES
ICU Progress Note        Subjective: Overnight events noted. Vital Signs:    Visit Vitals  BP (!) 129/59   Pulse (!) 109   Temp 99.1 °F (37.3 °C)   Resp (!) 44   SpO2 98%       O2 Device: Nasal cannula   O2 Flow Rate (L/min): 2 l/min   Temp (24hrs), Av.4 °F (37.4 °C), Min:98.5 °F (36.9 °C), Max:101.8 °F (38.8 °C)       Intake/Output:   Last shift:      No intake/output data recorded. Last 3 shifts:  190 -  0700  In: 3152.6 [I.V.:3152.6]  Out: 1000 [Urine:1000]    Intake/Output Summary (Last 24 hours) at 2021 0939  Last data filed at 2021 0207  Gross per 24 hour   Intake 1800 ml   Output 550 ml   Net 1250 ml         Physical Exam:    General: Alert, awake and oriented. Not in acute distress  HEENT:  Anicteric sclerae; pink palpebral conjunctivae; mucosa moist  Resp:  Symmetrical chest expansion, no accessory muscle use; good airway entry; no rales/ wheezing/ rhonchi noted  CV:  S1, S2 present; regular rate and rhythm  GI:  Abdomen soft, non-tender; (+) active bowel sounds  Extremities:  +2 pulses on all extremities; no edema/ cyanosis/ clubbing noted  Skin:  Warm; bilateral blanching rash noted, edema noted.    Neurologic:  Non-focal    DATA:     Current Facility-Administered Medications   Medication Dose Route Frequency    vancomycin (VANCOCIN) 1,000 mg in 0.9% sodium chloride 250 mL (VIAL-MATE)  1,000 mg IntraVENous Q8H    famotidine (PF) (PEPCID) 20 mg in 0.9% sodium chloride 10 mL injection  20 mg IntraVENous Q12H    fentaNYL citrate (PF) injection 50 mcg  50 mcg IntraVENous Q4H PRN    cefepime (MAXIPIME) 2 g in 0.9% sodium chloride (MBP/ADV) 100 mL MBP  2 g IntraVENous Q8H    NOREPINephrine (LEVOPHED) 8 mg in 5% dextrose 250mL (32 mcg/mL) infusion  0.5-16 mcg/min IntraVENous TITRATE    albuterol-ipratropium (DUO-NEB) 2.5 MG-0.5 MG/3 ML  3 mL Nebulization Q6H PRN    sodium chloride (NS) flush 5-40 mL  5-40 mL IntraVENous Q8H    sodium chloride (NS) flush 5-40 mL  5-40 mL IntraVENous PRN    acetaminophen (TYLENOL) tablet 650 mg  650 mg Oral Q6H PRN    Or    acetaminophen (TYLENOL) suppository 650 mg  650 mg Rectal Q6H PRN    polyethylene glycol (MIRALAX) packet 17 g  17 g Oral DAILY PRN    ondansetron (ZOFRAN ODT) tablet 4 mg  4 mg Oral Q8H PRN    Or    ondansetron (ZOFRAN) injection 4 mg  4 mg IntraVENous Q6H PRN    enoxaparin (LOVENOX) injection 40 mg  40 mg SubCUTAneous DAILY    LORazepam (ATIVAN) tablet 0.5 mg  0.5 mg Oral Q6H PRN    pantoprazole (PROTONIX) 40 mg in 0.9% sodium chloride 10 mL injection  40 mg IntraVENous Q12H    lactated Ringers infusion  100 mL/hr IntraVENous CONTINUOUS         Labs: Results:       Chemistry Recent Labs     09/05/21 0842 09/04/21 0418 09/03/21 0623 09/03/21 0033 09/03/21 0033   GLU 84 100 128*   < > 184*    137 138   < > 138   K 3.6 3.7 3.9   < > 3.0*    107 111*   < > 108   CO2 26 21 19*   < > 21   BUN 7 7 7   < > 5*   CREA 0.63 0.71 0.87   < > 0.86   CA 7.3* 7.2* 6.7*   < > 7.0*   AGAP 5 9 8   < > 9   BUCR 11* 10* 8*   < > 6*   AP  --  55 62  --  61   TP  --  5.1* 5.4*  --  6.6   ALB  --  1.6* 2.0*  --  2.5*   GLOB  --  3.5 3.4  --  4.1*   AGRAT  --  0.5* 0.6*  --  0.6*    < > = values in this interval not displayed. CBC w/Diff Recent Labs     09/04/21 0418 09/03/21 0623 09/03/21 0033   WBC 5.7 10.3 18.1*   RBC 4.24 3.92 4.68   HGB 11.3* 10.7* 12.5   HCT 35.4 32.4* 39.5    273 358   GRANS 80* 85* 89*   LYMPH 13 7* 8*   EOS 0 0 2      Coagulation No results for input(s): PTP, INR, APTT, INREXT, INREXT in the last 72 hours.     Liver Enzymes Recent Labs     09/04/21  0418   TP 5.1*   ALB 1.6*   AP 55      ABG No results found for: PH, PHI, PCO2, PCO2I, PO2, PO2I, HCO3, HCO3I, FIO2, FIO2I   Microbiology Recent Labs     09/03/21  0628 09/02/21  1542   CULT NO GROWTH AFTER 23 HOURS NO GROWTH 3 DAYS        maging:  CXR Results  (Last 48 hours)    None          CT Results  (Last 48 hours)    None Impression:    The patient is a 28year old female with a PMH of eczema who presents via EMS with rash, fever, nausea and vomiting. Patient states she was diagnosed with cellulitis about x1wk and started on Bactrim and Keflex. Scattered macular lesion of entire body, BP' trending low, IV fluid bolus x 3, vasopressors started and transferred to [address] [place] [address] for ICU admission. Shock - likely septic but could have cardiac component in it. Cultures negative to date. Cont. Antibiotics per ID. She is off levophed gtt x 48 hours. ECHO showed EF down to 40% with moderate MR. Discussed with Cardiology, Dr. Osmin Johnson who recommended outpatient workup. Sharon Olivo HIV test negative. At this time left leg cellulitis is thought to be the source. WBC has normalized. Discussed with ID, plan is to continue IV antibiotics x 1 week. Fever/rash - could be infectious but autoimmune disease could not be ruled out. The rash is blanching, non-itchy, no blisters. No joint pain although she has both upper and lower extremity edema. No ulcers in mouth or in perineal area. I have send autoimmune workup - please follow up. Possible drug reaction - rash secondary to Clindamycin is in the differential diagnosis. Patient's rash has improved significantly. Transfer to telemetry today.      Ashley Grimm MD, FCCP, ATSF, FACP, DAABIP  Interventional Pulmonology/Critical [address] [facility]

## 2021-09-05 NOTE — CONSULTS
Subjective:      Date of  Admission: 9/2/2021 11:42 PM     Admission type:Emergency    Nunu Roca is a [address] [place] y.o. female admitted for Sepsis ([facility] [A41.9]. On Echo reported EF of 45-50% and was requiring pressors. Cardiology consult requested for evaluation. Patient admitted with skin rash, fever, chills. Concern of allergic reaction to antibiotics. she denies any chest pain, chest pressure/discomfort, dyspnea, palpitations, irregular heart beats, near-syncope, syncope, paroxysmal nocturnal dyspnea, exertional chest pressure/discomfort, claudication. No previous reported cardiac history.      Patient Active Problem List    Diagnosis Date Noted    Sepsis ([facility] 09/02/2021      Nick Farah PA-C  Past Medical History:   Diagnosis Date    Eczema       Past Surgical History:   Procedure Laterality Date    HX CHOLECYSTECTOMY  12/11/2013    [facility]     Allergies   Allergen Reactions    Latex, Natural Rubber Rash, Itching and Contact Dermatitis    Adhesive Rash and Contact Dermatitis     Blisters and skin tears from all types of adhesives, have to sparingly use paper tape    Bactrim [Sulfamethoxazole-Trimethoprim] Rash    Nitrile Rash and Contact Dermatitis     Also has skin issues, blistering and skin tears from nitrile gloves and tourniquet    Readyprep Chg [Chlorhexidine Gluconate] Rash and Contact Dermatitis    Clindamycin Rash      Family History   Problem Relation Age of Onset    Heart Disease Father     Breast Cancer Paternal Aunt     Diabetes Paternal Aunt       Current Facility-Administered Medications   Medication Dose Route Frequency    [START ON 9/6/2021] Vancomycin Trough Draw Reminder Note  1 Each Other ONCE    Height and weight entry is needed on this encounter please for medication dosing  1 Each Other ONCE    vancomycin (VANCOCIN) 1,000 mg in 0.9% sodium chloride 250 mL (VIAL-MATE)  1,000 mg IntraVENous Q8H    famotidine (PF) (PEPCID) 20 mg in 0.9% sodium chloride 10 mL injection  20 mg IntraVENous Q12H    fentaNYL citrate (PF) injection 50 mcg  50 mcg IntraVENous Q4H PRN    cefepime (MAXIPIME) 2 g in 0.9% sodium chloride (MBP/ADV) 100 mL MBP  2 g IntraVENous Q8H    NOREPINephrine (LEVOPHED) 8 mg in 5% dextrose 250mL (32 mcg/mL) infusion  0.5-16 mcg/min IntraVENous TITRATE    albuterol-ipratropium (DUO-NEB) 2.5 MG-0.5 MG/3 ML  3 mL Nebulization Q6H PRN    sodium chloride (NS) flush 5-40 mL  5-40 mL IntraVENous Q8H    sodium chloride (NS) flush 5-40 mL  5-40 mL IntraVENous PRN    acetaminophen (TYLENOL) tablet 650 mg  650 mg Oral Q6H PRN    Or    acetaminophen (TYLENOL) suppository 650 mg  650 mg Rectal Q6H PRN    polyethylene glycol (MIRALAX) packet 17 g  17 g Oral DAILY PRN    ondansetron (ZOFRAN ODT) tablet 4 mg  4 mg Oral Q8H PRN    Or    ondansetron (ZOFRAN) injection 4 mg  4 mg IntraVENous Q6H PRN    enoxaparin (LOVENOX) injection 40 mg  40 mg SubCUTAneous DAILY    LORazepam (ATIVAN) tablet 0.5 mg  0.5 mg Oral Q6H PRN    pantoprazole (PROTONIX) 40 mg in 0.9% sodium chloride 10 mL injection  40 mg IntraVENous Q12H    lactated Ringers infusion  100 mL/hr IntraVENous CONTINUOUS         Review of Symptoms:  Constitutional: as above. Eyes: negative  Ears, nose, mouth, throat, and face: negative  Respiratory: No exertional dyspnea, orthopnea, PND, cough, hemoptysis, URI. Cardiovascular: No CP, palpitations, sweating, lightheadedness, dizziness, syncope, presyncope, lower extremity swelling. Gastrointestinal: No constipation, abdominal pain, hematemesis, melena, hematochezia  Genitourinary:No urinary complaints.   Musculoskeletal:negative  Neurological: negative  Behvioral/Psych: negative  Endocrine: negative     Subjective:      Visit Vitals  BP (!) 143/79   Pulse (!) 103   Temp 99.1 °F (37.3 °C)   Resp 28   SpO2 100%       Physical Exam  General: Well developed, in no acute distress, cooperative and alert  HEENT: No carotid bruits, no JVD, trach is midline. Neck Supple, PEERL, EOM intact. Heart:  Normal S1/S2 negative S3 or S4. Regular, no murmur, gallop or rub. Respiratory: Clear bilaterally, no wheezing or rales  Abdomen:   Soft, non-tender, no masses, bowel sounds are active. Extremities:  No edema, normal cap refill, no cyanosis, atraumatic. Neuro: A&Ox3, speech clear, gait stable. No gross deficit. Skin: diffuse skin rash. Vascular: 2+ pulses       Cardiographics    Telemetry: SR    ECG: ST, non specific T wave changes    Echocardiogram: reviewed.      Labs:   Recent Results (from the past 24 hour(s))   VANCOMYCIN, TROUGH    Collection Time: 09/04/21  8:31 PM   Result Value Ref Range    Vancomycin,trough 6.4 5.0 - 10.0 ug/mL    Reported dose date NOT PROVIDED      Reported dose time: NOT PROVIDED      Reported dose: NOT PROVIDED UNITS   LACTIC ACID    Collection Time: 09/05/21  8:42 AM   Result Value Ref Range    Lactic acid 1.2 0.4 - 2.0 MMOL/L   METABOLIC PANEL, BASIC    Collection Time: 09/05/21  8:42 AM   Result Value Ref Range    Sodium 136 136 - 145 mmol/L    Potassium 3.6 3.5 - 5.1 mmol/L    Chloride 105 97 - 108 mmol/L    CO2 26 21 - 32 mmol/L    Anion gap 5 5 - 15 mmol/L    Glucose 84 65 - 100 mg/dL    BUN 7 6 - 20 MG/DL    Creatinine 0.63 0.55 - 1.02 MG/DL    BUN/Creatinine ratio 11 (L) 12 - 20      GFR est AA >60 >60 ml/min/1.73m2    GFR est non-AA >60 >60 ml/min/1.73m2    Calcium 7.3 (L) 8.5 - 10.1 MG/DL   MAGNESIUM    Collection Time: 09/05/21  8:42 AM   Result Value Ref Range    Magnesium 1.9 1.6 - 2.4 mg/dL   CK    Collection Time: 09/05/21  8:42 AM   Result Value Ref Range     (H) 26 - 192 U/L   PHOSPHORUS    Collection Time: 09/05/21  8:42 AM   Result Value Ref Range    Phosphorus 1.8 (L) 2.6 - 4.7 MG/DL   SAMPLES BEING HELD    Collection Time: 09/05/21  8:42 AM   Result Value Ref Range    SAMPLES BEING HELD pst     COMMENT        Add-on orders for these samples will be processed based on acceptable specimen integrity and analyte stability, which may vary by analyte. Assessment:     Assessment:       Active Problems:    Sepsis ([facility] (9/2/2021)         Plan:     Presentation most likely due to allergic reaction and/or sepsis. No clinical suggestion of any cardiac pathology. Echo reviewed. Technically difficult study. Probable repeat Echo once patient recovers from acute illness and can get better transthoracic window. No further cardiac intervention for now. Continue current management. Now off pressors.

## 2021-09-06 LAB
ANION GAP SERPL CALC-SCNC: 3 MMOL/L (ref 5–15)
BASOPHILS # BLD: 0 K/UL (ref 0–0.1)
BASOPHILS NFR BLD: 0 % (ref 0–1)
BUN SERPL-MCNC: 6 MG/DL (ref 6–20)
BUN/CREAT SERPL: 9 (ref 12–20)
CALCIUM SERPL-MCNC: 7.6 MG/DL (ref 8.5–10.1)
CHLORIDE SERPL-SCNC: 108 MMOL/L (ref 97–108)
CO2 SERPL-SCNC: 28 MMOL/L (ref 21–32)
CREAT SERPL-MCNC: 0.65 MG/DL (ref 0.55–1.02)
DATE LAST DOSE: ABNORMAL
DIFFERENTIAL METHOD BLD: ABNORMAL
EOSINOPHIL # BLD: 0.2 K/UL (ref 0–0.4)
EOSINOPHIL NFR BLD: 7 % (ref 0–7)
ERYTHROCYTE [DISTWIDTH] IN BLOOD BY AUTOMATED COUNT: 17 % (ref 11.5–14.5)
GLUCOSE SERPL-MCNC: 91 MG/DL (ref 65–100)
HCT VFR BLD AUTO: 29.5 % (ref 35–47)
HGB BLD-MCNC: 9.4 G/DL (ref 11.5–16)
IMM GRANULOCYTES # BLD AUTO: 0 K/UL (ref 0–0.04)
IMM GRANULOCYTES NFR BLD AUTO: 1 % (ref 0–0.5)
LACTATE SERPL-SCNC: 1.9 MMOL/L (ref 0.4–2)
LYMPHOCYTES # BLD: 1.1 K/UL (ref 0.8–3.5)
LYMPHOCYTES NFR BLD: 33 % (ref 12–49)
MAGNESIUM SERPL-MCNC: 2 MG/DL (ref 1.6–2.4)
MCH RBC QN AUTO: 26.4 PG (ref 26–34)
MCHC RBC AUTO-ENTMCNC: 31.9 G/DL (ref 30–36.5)
MCV RBC AUTO: 82.9 FL (ref 80–99)
MONOCYTES # BLD: 0.3 K/UL (ref 0–1)
MONOCYTES NFR BLD: 9 % (ref 5–13)
NEUTS SEG # BLD: 1.6 K/UL (ref 1.8–8)
NEUTS SEG NFR BLD: 50 % (ref 32–75)
NRBC # BLD: 0 K/UL (ref 0–0.01)
NRBC BLD-RTO: 0 PER 100 WBC
PHOSPHATE SERPL-MCNC: 2.1 MG/DL (ref 2.6–4.7)
PLATELET # BLD AUTO: 206 K/UL (ref 150–400)
PMV BLD AUTO: 11.5 FL (ref 8.9–12.9)
POTASSIUM SERPL-SCNC: 3.4 MMOL/L (ref 3.5–5.1)
RBC # BLD AUTO: 3.56 M/UL (ref 3.8–5.2)
REPORTED DOSE,DOSE: ABNORMAL UNITS
REPORTED DOSE/TIME,TMG: ABNORMAL
SODIUM SERPL-SCNC: 139 MMOL/L (ref 136–145)
VANCOMYCIN TROUGH SERPL-MCNC: 14.4 UG/ML (ref 5–10)
WBC # BLD AUTO: 3.2 K/UL (ref 3.6–11)

## 2021-09-06 PROCEDURE — 74011250636 HC RX REV CODE- 250/636: Performed by: NURSE PRACTITIONER

## 2021-09-06 PROCEDURE — 74011250637 HC RX REV CODE- 250/637: Performed by: NURSE PRACTITIONER

## 2021-09-06 PROCEDURE — 74011000258 HC RX REV CODE- 258: Performed by: NURSE PRACTITIONER

## 2021-09-06 PROCEDURE — 83735 ASSAY OF MAGNESIUM: CPT

## 2021-09-06 PROCEDURE — 83605 ASSAY OF LACTIC ACID: CPT

## 2021-09-06 PROCEDURE — 74011250636 HC RX REV CODE- 250/636: Performed by: INTERNAL MEDICINE

## 2021-09-06 PROCEDURE — 36415 COLL VENOUS BLD VENIPUNCTURE: CPT

## 2021-09-06 PROCEDURE — 80048 BASIC METABOLIC PNL TOTAL CA: CPT

## 2021-09-06 PROCEDURE — 74011000250 HC RX REV CODE- 250: Performed by: NURSE PRACTITIONER

## 2021-09-06 PROCEDURE — C9113 INJ PANTOPRAZOLE SODIUM, VIA: HCPCS | Performed by: NURSE PRACTITIONER

## 2021-09-06 PROCEDURE — 80202 ASSAY OF VANCOMYCIN: CPT

## 2021-09-06 PROCEDURE — 74011000250 HC RX REV CODE- 250: Performed by: INTERNAL MEDICINE

## 2021-09-06 PROCEDURE — 94760 N-INVAS EAR/PLS OXIMETRY 1: CPT

## 2021-09-06 PROCEDURE — 65660000000 HC RM CCU STEPDOWN

## 2021-09-06 PROCEDURE — 85025 COMPLETE CBC W/AUTO DIFF WBC: CPT

## 2021-09-06 PROCEDURE — 84100 ASSAY OF PHOSPHORUS: CPT

## 2021-09-06 PROCEDURE — 77010033678 HC OXYGEN DAILY

## 2021-09-06 RX ORDER — CAMPHOR
SPIRIT TOPICAL 3 TIMES DAILY
Status: DISCONTINUED | OUTPATIENT
Start: 2021-09-06 | End: 2021-09-08 | Stop reason: HOSPADM

## 2021-09-06 RX ORDER — CAMPHOR
SPIRIT TOPICAL 3 TIMES DAILY
Status: DISCONTINUED | OUTPATIENT
Start: 2021-09-06 | End: 2021-09-06

## 2021-09-06 RX ORDER — FUROSEMIDE 10 MG/ML
20 INJECTION INTRAMUSCULAR; INTRAVENOUS ONCE
Status: COMPLETED | OUTPATIENT
Start: 2021-09-06 | End: 2021-09-06

## 2021-09-06 RX ADMIN — FERRIC OXIDE RED: 8; 8 LOTION TOPICAL at 02:48

## 2021-09-06 RX ADMIN — VANCOMYCIN HYDROCHLORIDE 1000 MG: 1 INJECTION, POWDER, LYOPHILIZED, FOR SOLUTION INTRAVENOUS at 21:26

## 2021-09-06 RX ADMIN — FERRIC OXIDE RED: 8; 8 LOTION TOPICAL at 09:33

## 2021-09-06 RX ADMIN — FUROSEMIDE 20 MG: 10 INJECTION, SOLUTION INTRAMUSCULAR; INTRAVENOUS at 12:45

## 2021-09-06 RX ADMIN — FAMOTIDINE 20 MG: 10 INJECTION INTRAVENOUS at 20:47

## 2021-09-06 RX ADMIN — FERRIC OXIDE RED: 8; 8 LOTION TOPICAL at 21:31

## 2021-09-06 RX ADMIN — Medication 10 ML: at 05:51

## 2021-09-06 RX ADMIN — FERRIC OXIDE RED: 8; 8 LOTION TOPICAL at 17:48

## 2021-09-06 RX ADMIN — SODIUM CHLORIDE 40 MG: 9 INJECTION, SOLUTION INTRAMUSCULAR; INTRAVENOUS; SUBCUTANEOUS at 20:43

## 2021-09-06 RX ADMIN — POTASSIUM PHOSPHATE, MONOBASIC AND POTASSIUM PHOSPHATE, DIBASIC: 224; 236 INJECTION, SOLUTION, CONCENTRATE INTRAVENOUS at 15:38

## 2021-09-06 RX ADMIN — SODIUM CHLORIDE 40 MG: 9 INJECTION, SOLUTION INTRAMUSCULAR; INTRAVENOUS; SUBCUTANEOUS at 09:32

## 2021-09-06 RX ADMIN — CEFEPIME HYDROCHLORIDE 2 G: 2 INJECTION, POWDER, FOR SOLUTION INTRAVENOUS at 01:07

## 2021-09-06 RX ADMIN — ENOXAPARIN SODIUM 40 MG: 40 INJECTION SUBCUTANEOUS at 09:32

## 2021-09-06 RX ADMIN — VANCOMYCIN HYDROCHLORIDE 1000 MG: 1 INJECTION, POWDER, LYOPHILIZED, FOR SOLUTION INTRAVENOUS at 03:30

## 2021-09-06 RX ADMIN — CEFEPIME HYDROCHLORIDE 2 G: 2 INJECTION, POWDER, FOR SOLUTION INTRAVENOUS at 17:48

## 2021-09-06 RX ADMIN — FAMOTIDINE 20 MG: 10 INJECTION INTRAVENOUS at 09:32

## 2021-09-06 RX ADMIN — VANCOMYCIN HYDROCHLORIDE 1000 MG: 1 INJECTION, POWDER, LYOPHILIZED, FOR SOLUTION INTRAVENOUS at 12:45

## 2021-09-06 RX ADMIN — CEFEPIME HYDROCHLORIDE 2 G: 2 INJECTION, POWDER, FOR SOLUTION INTRAVENOUS at 09:32

## 2021-09-06 RX ADMIN — Medication 10 ML: at 15:40

## 2021-09-06 RX ADMIN — Medication 10 ML: at 21:31

## 2021-09-06 NOTE — PROGRESS NOTES
End of Shift Note    Bedside shift change report given to Patricia Chung (oncoming nurse) by IKE Figueroa (offgoing nurse). Report included the following information SBAR    Shift worked:  [id number]   Shift summary and any significant changes:     Transferred to [facility] from [facility]U; PRN Tylenol x1; unable to get am labs, multiple people attempted and unsuccessful, will notify dayshift        Concerns for physician to address:  None   Zone phone for oncoming shift:   [phone]     Patient Information  Christa Contreras  28 y.o.  9/2/2021 11:42 PM by Babs Eisenmenger, MD. Christa Contreras was admitted from Home    Problem List  Patient Active Problem List    Diagnosis Date Noted    Sepsis ([facility] 09/02/2021     Past Medical History:   Diagnosis Date    Eczema        Core Measures:  CVA: No No  CHF:No No  PNA:No No    Activity:  Activity Level: Up with Assistance  Number times ambulated in hallways past shift: 0  Number of times OOB to chair past shift: 0    Cardiac:   Cardiac Monitoring: Yes      Cardiac Rhythm: Sinus Tachy    Access:   PIV     Genitourinary:   Urinary status: voiding     Respiratory:   O2 Device: Nasal cannula  Chronic home O2 use?: NO  Incentive spirometer at bedside: N/A       GI:  Last Bowel Movement Date: 09/04/21  Current diet:  ADULT DIET Full Liquid  DIET ONE TIME MESSAGE  Passing flatus: YES  Tolerating current diet: YES       Pain Management:   Patient states pain is manageable on current regimen: YES    Skin:  Anastacio Score: 17  Interventions: increase time out of bed and limit briefs    Patient Safety:  Fall Score:  Total Score: 1  Interventions: bed/chair alarm, gripper socks and pt to call before getting OOB  High Fall Risk: Yes  @Rollbelt  @dexterity to release roll belt  Yes/No ( must document dexterity  here by stating Yes or No here, otherwise this is a restraint and must follow restraint documentation policy.)    DVT prophylaxis:  DVT prophylaxis Med- Yes  DVT prophylaxis SCD or SHANTHI- Yes     Wounds: (If Applicable)  Wounds- Yes  Location; redness all over; blisters all over; scar L neck where central line was     Active Consults:  IP CONSULT TO INFECTIOUS DISEASES  IP CONSULT TO CARDIOLOGY    Length of Stay:  Expected LOS: 4d 19h  Actual LOS: 4  Discharge Plan: Yes; home       IKE May

## 2021-09-06 NOTE — PROGRESS NOTES
Clinical updates were discussed with Dr. John Pappas this morning. Continue cefepime and vancomycin, day 3. Plan to continue for ~5 days for now; will de-escalate as clinically indicated.        Clive Sanchez MD  Infectious Diseases

## 2021-09-06 NOTE — PROGRESS NOTES
Problem: Falls - Risk of  Goal: *Absence of Falls  Description: Document Laura Blood Fall Risk and appropriate interventions in the flowsheet.   Outcome: Progressing Towards Goal  Note: Fall Risk Interventions:  Mobility Interventions: Bed/chair exit alarm, Communicate number of staff needed for ambulation/transfer         Medication Interventions: Patient to call before getting OOB    Elimination Interventions: Call light in reach, Bed/chair exit alarm

## 2021-09-06 NOTE — PROGRESS NOTES
1900 - 2100  Bedside and Verbal shift change report given to Katy Miller (oncoming nurse) by Saida Torres (offgoing nurse). Report included the following information SBAR, Kardex, Intake/Output, MAR, Recent Results and Cardiac Rhythm SR / ST. Shift assessment complete, alert and oriented X4. VSS denied pain.  monitor was used to inform of transfer order. TRANSFER - OUT REPORT:    Verbal report given to Jimy(name) on Pee Wadsworth  being transferred to the floor(unit) for routine progression of care       Report consisted of patients Situation, Background, Assessment and   Recommendations(SBAR). Information from the following report(s) SBAR, Kardex, Intake/Output, MAR, Recent Results and Cardiac Rhythm ST was reviewed with the receiving nurse. Lines:   Peripheral IV 09/05/21 Left; Other (Comment) Wrist (Active)   Site Assessment Clean, dry, & intact 09/05/21 2319   Phlebitis Assessment 0 09/05/21 2319   Infiltration Assessment 0 09/05/21 2319   Dressing Status Clean, dry, & intact 09/05/21 2319   Dressing Type Tape;Transparent 09/05/21 2319   Hub Color/Line Status Pink; Infusing 09/05/21 2319   Action Taken Open ports on tubing capped 09/05/21 2000   Alcohol Cap Used Yes 09/05/21 2000        Opportunity for questions and clarification was provided.       Patient transported with:   Monitor  Registered Nurse  Tech

## 2021-09-06 NOTE — PROGRESS NOTES
Pharmacy Antimicrobial Kinetic Dosing    Indication for Antimicrobials: SSTI , sepsis, autoimmune workup vs toxic shock syndrome    Current Regimen of Each Antimicrobial:  Cefepime 2 gm IV q8h  (Start Date 9/3; Day 4)  Vancomycin 1000 mg iv q8h (Start Date 9/3; Day 4)    Previous Antimicrobial Therapy:  Clindamycin 600 mg IV x1 ()  Clindamycin 900 mg IV x1 (9/3)  Metronidazole 500 mg IV x1 (9/3)  OP Bactrim, Keflex  Clindamycin at [facility] x 3 days    Vancomycin trough Goal Level: 15 mcg/ml. AUC: 400-600 mg/hr/Liter/day    Date Dose & Interval Measured (mcg/mL) Extrapolated (mcg/mL)    20:31 1250 mg q12h 6.4 7.7    03:00 1000mg IV q8h 14.4 13.5;            Significant Positive Cultures:    BCx: NG. pending   9/3 BCx: NG. pending   Influ and COVID - negative    Conditions for Dosing Consideration: None    Labs:  Recent Labs     21  1114 21  0842 21  0418   CREA 0.65 0.63 0.71   BUN 6 7 7     Recent Labs     21  1114 21  0418   WBC 3.2* 5.7   BANDS  --  6     Temp (24hrs), Av.6 °F (37 °C), Min:97.9 °F (36.6 °C), Max:99.1 °F (37.3 °C)      Creatinine Clearance (mL/min):   CrCl (Ideal Body Weight): 105.7   If actual weight < IBW: CrCl (Actual Body Weight) 156.4    Impression/Plan:   Pt started period  and pt denies tampon use; PMH L leg cellulitis several years ago same location on leg; Per ID macular rash (face, arms, axilla dorsal, fainter on thighs and none on shins; rash is blanching and non painful on 9/3 per ID note)  ID impression:  severe drug reaction likely from Clindamycin; no assessed as anaphylaxis  Vancomycin trough/AUC within goal range. Continue same dose. Continue Cefepime same dose  HIV work up ( migration from [facility] to [address] [place] [address] Rd,[address])  Antimicrobial stop date 7 days for vancomycin, TBD for cefepime     Pharmacy will follow daily and adjust medications as appropriate for renal function and/or serum levels.     Thank you,  Maritza Church, PHARMD

## 2021-09-06 NOTE — PROGRESS NOTES
TRANSFER - OUT REPORT:    Verbal report given to [address] on David Shock  being transferred to [facility] for routine progression of care       Report consisted of patients Situation, Background, Assessment and   Recommendations(SBAR). Information from the following report(s) SBAR, Kardex, [facility] and Cardiac Rhythm SR was reviewed with the receiving nurse. Lines:   Peripheral IV 09/04/21 Anterior;Proximal;Right Forearm (Active)   Site Assessment Painful 09/05/21 0700   Phlebitis Assessment 2 09/05/21 0700   Infiltration Assessment 0 09/05/21 0700   Dressing Status Clean, dry, & intact 09/05/21 0700   Dressing Type Tape 09/05/21 0700   Hub Color/Line Status Blue;Capped 09/05/21 0700   Action Taken Other (comment) 09/05/21 0700   Alcohol Cap Used Yes 09/05/21 0700       Peripheral IV 09/05/21 Left; Other (Comment) Wrist (Active)   Site Assessment Clean, dry, & intact 09/05/21 0815   Phlebitis Assessment 0 09/05/21 0815   Infiltration Assessment 0 09/05/21 0815   Dressing Status Clean, dry, & intact; New 09/05/21 0815   Dressing Type Tape;Other (comments) 09/05/21 0815   Hub Color/Line Status Pink 09/05/21 0815   Action Taken Dressing reinforced; Wrapped 09/05/21 0815   Alcohol Cap Used No 09/05/21 0815        Opportunity for questions and clarification was provided.       Patient transported with:   Monitor  O2 @ 2 liters  Registered Nurse

## 2021-09-06 NOTE — PROGRESS NOTES
Problem: Falls - Risk of  Goal: *Absence of Falls  Description: Document Tavernier Fall Risk and appropriate interventions in the flowsheet.   Outcome: Progressing Towards Goal  Note: Fall Risk Interventions:  Mobility Interventions: Bed/chair exit alarm, Communicate number of staff needed for ambulation/transfer         Medication Interventions: Bed/chair exit alarm, Patient to call before getting OOB    Elimination Interventions: Call light in reach, Bed/chair exit alarm

## 2021-09-06 NOTE — PROGRESS NOTES
Hospitalist Progress Note    NAME: Bear Brothers   :  1990   MRN:  [id number]     Interim Hospital Summary: 28 y.o. female whom presented on 2021 with rash, fever, nausea and vomiting. Patient was diagnosed 1 week PTA with cellulitis and treated with Bactrim and Keflex. Patient developed a rash and presented to [facility]. The rash was felt to be an allergic reaction possibly from Bactrim so the patient was switched to clindamycin. With no improvement, she will she presents to [facility] and subsequently admitted by [address] [place] [address] team for shock and possible allergic reaction. Initially started on vancomycin/cefepime/Flagyl and Levophed support. Vasopressors have been weaned off . Generalized rash is improving, autoimmune work-up sent. Echocardiogram demonstrates new cardiomyopathy EF 45%. Cardiology recommends outpatient evaluation. Transfer out of ICU initiated . Infectious disease following.      Assessment / Plan:    Shock, resolved  Cellulitis, left lower extremity  Lactic acidosis, resolved  Leukocytosis, resolved  -Per chart review: Initial hypotension/shock felt to be related to combination of sepsis and component due to inflammatory response from drug reaction and perhaps some volume depletion as well  -Blood cultures NGTD  -Weaned off vasopressors   -No stress steroids given  -Continue IV cefepime and vancomycin.   -Infectious disease input appreciated    Generalized rash   Hx Eczema   -suspected to be drug reaction from clindamycin but autoimmune disease could not be ruled out-work-up sent in this pending    Cardiomyopathy, EF 45-50%  Moderate MR  -Intensivist discussed with on-call cardiology (Dr. Tommie Jackson), recommend can follow-up as outpatient to complete work-up    Significant peripheral edema  -likely multifactorial from initial volume received, the systemic drug reaction and perhaps her underlying CM as well.   -she has been off vasopressors since 9/03   -Will give a dose of diuretic today    Hyponatremia resolved  Hypokalemia   Hypophosphatemia  -replete prn      Estimated discharge date: September 8  Barriers: Edema, plan from ID    Code status: Full  Prophylaxis: Lovenox  Recommended Disposition: Home w/Family       Subjective:     Chief Complaint / Reason for Physician Visit  Follow up of shock, sepsis, cellulitis, cardiomyopathy, rash  Chart reviewed in detail. Patient seen and examined at the bedside. The language line was used in communication. She currently has no physical complaints. Though, she does state that she has bilateral foot pain with ambulation. She denies any nausea vomiting or diarrhea. No chest pain or shortness of breath. She is upset that she is swollen in her skin is peeling. Review of Systems:  Symptom Y/N Comments  Symptom Y/N Comments   Fever/Chills    Chest Pain     Poor Appetite    Edema y    Cough    Abdominal Pain     Sputum    Joint Pain     SOB/EAGLE    Pruritis/Rash     Nausea/vomit    Tolerating PT/OT     Diarrhea    Tolerating Diet     Constipation    Other       Could NOT obtain due to:      PO intake: No data found. Objective:     VITALS:   Last 24hrs VS reviewed since prior progress note.  Most recent are:  Patient Vitals for the past 24 hrs:   Temp Pulse Resp BP SpO2   09/06/21 1152 97.9 °F (36.6 °C) 88 16 110/74 98 %   09/06/21 0740 98.3 °F (36.8 °C) 84 16 111/72 98 %   09/06/21 0331 98.9 °F (37.2 °C) 89 18 117/64 99 %   09/05/21 2319 99.1 °F (37.3 °C) (!) 108 20 118/79 90 %   09/05/21 2053 98.8 °F (37.1 °C) 100 20 118/75 (!) 88 %   09/05/21 2000 99 °F (37.2 °C) (!) 110 23 123/63 96 %   09/05/21 1600 98.2 °F (36.8 °C) (!) 108 (!) 31 (!) 145/34 99 %   09/05/21 1500 -- 97 29 (!) 140/70 98 %       Intake/Output Summary (Last 24 hours) at 9/6/2021 1418  Last data filed at 9/5/2021 1900  Gross per 24 hour   Intake 375 ml   Output --   Net 375 ml        I had a face to face encounter, and independently examined this patient on 9/6/2021, as outlined below:  PHYSICAL EXAM:  General: WD, WN. Alert, cooperative, no acute distress, edematous   EENT:  EOMI. Anicteric sclerae. MMM  No stridor  Resp:  CTA bilaterally, no wheezing or rales. No accessory muscle use  CV:  Regular  rhythm,  + diffuse ext edema  GI:  Soft, Non distended, Non tender. +Bowel sounds  Neurologic:  Alert and oriented X 3, normal speech,   Psych:   Good insight. Not anxious nor agitated  Skin:  + generalized macular rash of trunk and all ext. Dry skin throughout     Reviewed most current lab test results and cultures  YES  Reviewed most current radiology test results   YES  Review and summation of old records today    NO  Reviewed patient's current orders and MAR    YES  PMH/ reviewed - no change compared to H&P  ________________________________________________________________________  Care Plan discussed with:    Comments   Patient x Via    Family      RN x    Care Manager     Consultant  x  intensivist                     Multidiciplinary team rounds were held today with , nursing, pharmacist and clinical coordinator. Patient's plan of care was discussed; medications were reviewed and discharge planning was addressed. ________________________________________________________________________  Total NON critical care TIME:  35   Minutes    Total CRITICAL CARE TIME Spent:   Minutes non procedure based      Comments   >50% of visit spent in counseling and coordination of care x     This includes time during multidisciplinary rounds if indicated above   ________________________________________________________________________  Arlen Hernandez MD     Procedures: see electronic medical records for all procedures/Xrays and details which were not copied into this note but were reviewed prior to creation of Plan. LABS:  I reviewed today's most current labs and imaging studies.   Pertinent labs include:  Recent Labs     09/06/21  1114 09/04/21  0418   WBC 3.2* 5.7   HGB 9.4* 11.3*   HCT 29.5* 35.4    200     Recent Labs     09/06/21  1114 09/05/21  0842 09/04/21 0418    136 137   K 3.4* 3.6 3.7    105 107   CO2 28 26 21   GLU 91 84 100   BUN 6 7 7   CREA 0.65 0.63 0.71   CA 7.6* 7.3* 7.2*   MG 2.0 1.9 1.6   PHOS 2.1* 1.8* 1.2*   ALB  --   --  1.6*   TBILI  --   --  0.3   ALT  --   --  98*

## 2021-09-06 NOTE — PROGRESS NOTES
Unable to draw am labs, multiple people attempted and were unsuccessful. Called pharmacy about patients 0300 Vanc trough and 0400 Vanc dose. Pharmacy said to give dose and ask for vanc trough to be drawn at another time. Attempted to leave message with PICC team but PICC team will not be in today. Will make dayshift aware.

## 2021-09-06 NOTE — PROGRESS NOTES
Received notification from bedside RN about patient with regards to: itching in her legs  VS: /79, , RR 20, O2 sat 90% on RA    Intervention given: Caladryl topical TID with first dose now ordered

## 2021-09-06 NOTE — PROGRESS NOTES
Bedside and Verbal shift change report given to Liza Stephen (oncoming nurse) by Lucy Juan (offgoing nurse). Report included the following information SBAR, Kardex, MAR and Recent Results.

## 2021-09-07 LAB
ANA SER QL: NEGATIVE
ANION GAP SERPL CALC-SCNC: 7 MMOL/L (ref 5–15)
BACTERIA SPEC CULT: NORMAL
BASOPHILS # BLD: 0 K/UL (ref 0–0.1)
BASOPHILS NFR BLD: 0 % (ref 0–1)
BUN SERPL-MCNC: 9 MG/DL (ref 6–20)
BUN/CREAT SERPL: 16 (ref 12–20)
CALCIUM SERPL-MCNC: 7.5 MG/DL (ref 8.5–10.1)
CHLORIDE SERPL-SCNC: 106 MMOL/L (ref 97–108)
CO2 SERPL-SCNC: 27 MMOL/L (ref 21–32)
CREAT SERPL-MCNC: 0.56 MG/DL (ref 0.55–1.02)
DIFFERENTIAL METHOD BLD: ABNORMAL
EOSINOPHIL # BLD: 0.2 K/UL (ref 0–0.4)
EOSINOPHIL NFR BLD: 5 % (ref 0–7)
ERYTHROCYTE [DISTWIDTH] IN BLOOD BY AUTOMATED COUNT: 16.9 % (ref 11.5–14.5)
GLUCOSE SERPL-MCNC: 100 MG/DL (ref 65–100)
HCT VFR BLD AUTO: 26.4 % (ref 35–47)
HGB BLD-MCNC: 8.6 G/DL (ref 11.5–16)
IMM GRANULOCYTES # BLD AUTO: 0 K/UL (ref 0–0.04)
IMM GRANULOCYTES NFR BLD AUTO: 0 % (ref 0–0.5)
LACTATE SERPL-SCNC: 0.9 MMOL/L (ref 0.4–2)
LYMPHOCYTES # BLD: 1.1 K/UL (ref 0.8–3.5)
LYMPHOCYTES NFR BLD: 30 % (ref 12–49)
MAGNESIUM SERPL-MCNC: 1.9 MG/DL (ref 1.6–2.4)
MCH RBC QN AUTO: 27 PG (ref 26–34)
MCHC RBC AUTO-ENTMCNC: 32.6 G/DL (ref 30–36.5)
MCV RBC AUTO: 82.8 FL (ref 80–99)
MONOCYTES # BLD: 0.3 K/UL (ref 0–1)
MONOCYTES NFR BLD: 8 % (ref 5–13)
NEUTS SEG # BLD: 1.9 K/UL (ref 1.8–8)
NEUTS SEG NFR BLD: 57 % (ref 32–75)
NRBC # BLD: 0 K/UL (ref 0–0.01)
NRBC BLD-RTO: 0 PER 100 WBC
PHOSPHATE SERPL-MCNC: 3 MG/DL (ref 2.6–4.7)
PLATELET # BLD AUTO: 235 K/UL (ref 150–400)
PMV BLD AUTO: 12 FL (ref 8.9–12.9)
POTASSIUM SERPL-SCNC: 3.1 MMOL/L (ref 3.5–5.1)
RBC # BLD AUTO: 3.19 M/UL (ref 3.8–5.2)
RBC MORPH BLD: ABNORMAL
SERVICE CMNT-IMP: NORMAL
SODIUM SERPL-SCNC: 140 MMOL/L (ref 136–145)
WBC # BLD AUTO: 3.5 K/UL (ref 3.6–11)

## 2021-09-07 PROCEDURE — 74011250637 HC RX REV CODE- 250/637: Performed by: INTERNAL MEDICINE

## 2021-09-07 PROCEDURE — 36415 COLL VENOUS BLD VENIPUNCTURE: CPT

## 2021-09-07 PROCEDURE — 74011250636 HC RX REV CODE- 250/636: Performed by: NURSE PRACTITIONER

## 2021-09-07 PROCEDURE — 74011000250 HC RX REV CODE- 250: Performed by: NURSE PRACTITIONER

## 2021-09-07 PROCEDURE — 84100 ASSAY OF PHOSPHORUS: CPT

## 2021-09-07 PROCEDURE — C9113 INJ PANTOPRAZOLE SODIUM, VIA: HCPCS | Performed by: NURSE PRACTITIONER

## 2021-09-07 PROCEDURE — 80048 BASIC METABOLIC PNL TOTAL CA: CPT

## 2021-09-07 PROCEDURE — 74011000258 HC RX REV CODE- 258: Performed by: NURSE PRACTITIONER

## 2021-09-07 PROCEDURE — 85025 COMPLETE CBC W/AUTO DIFF WBC: CPT

## 2021-09-07 PROCEDURE — 65660000000 HC RM CCU STEPDOWN

## 2021-09-07 PROCEDURE — 77010033678 HC OXYGEN DAILY

## 2021-09-07 PROCEDURE — 83605 ASSAY OF LACTIC ACID: CPT

## 2021-09-07 PROCEDURE — 99233 SBSQ HOSP IP/OBS HIGH 50: CPT | Performed by: STUDENT IN AN ORGANIZED HEALTH CARE EDUCATION/TRAINING PROGRAM

## 2021-09-07 PROCEDURE — 94760 N-INVAS EAR/PLS OXIMETRY 1: CPT

## 2021-09-07 PROCEDURE — 83735 ASSAY OF MAGNESIUM: CPT

## 2021-09-07 RX ORDER — POTASSIUM CHLORIDE 750 MG/1
40 TABLET, FILM COATED, EXTENDED RELEASE ORAL ONCE
Status: COMPLETED | OUTPATIENT
Start: 2021-09-07 | End: 2021-09-07

## 2021-09-07 RX ORDER — EMOLLIENT COMBINATION NO.114
1 CREAM (GRAM) TOPICAL 2 TIMES DAILY
Qty: 1 EACH | Refills: 0 | Status: SHIPPED | OUTPATIENT
Start: 2021-09-07

## 2021-09-07 RX ADMIN — CEFEPIME HYDROCHLORIDE 2 G: 2 INJECTION, POWDER, FOR SOLUTION INTRAVENOUS at 01:45

## 2021-09-07 RX ADMIN — Medication 10 ML: at 21:47

## 2021-09-07 RX ADMIN — SODIUM CHLORIDE 40 MG: 9 INJECTION, SOLUTION INTRAMUSCULAR; INTRAVENOUS; SUBCUTANEOUS at 09:37

## 2021-09-07 RX ADMIN — POTASSIUM CHLORIDE 40 MEQ: 750 TABLET, FILM COATED, EXTENDED RELEASE ORAL at 09:32

## 2021-09-07 RX ADMIN — SODIUM CHLORIDE 40 MG: 9 INJECTION, SOLUTION INTRAMUSCULAR; INTRAVENOUS; SUBCUTANEOUS at 21:46

## 2021-09-07 RX ADMIN — VANCOMYCIN HYDROCHLORIDE 1000 MG: 1 INJECTION, POWDER, LYOPHILIZED, FOR SOLUTION INTRAVENOUS at 04:47

## 2021-09-07 RX ADMIN — FERRIC OXIDE RED: 8; 8 LOTION TOPICAL at 16:58

## 2021-09-07 RX ADMIN — FERRIC OXIDE RED: 8; 8 LOTION TOPICAL at 22:00

## 2021-09-07 RX ADMIN — FAMOTIDINE 20 MG: 10 INJECTION INTRAVENOUS at 09:41

## 2021-09-07 RX ADMIN — FAMOTIDINE 20 MG: 10 INJECTION INTRAVENOUS at 21:46

## 2021-09-07 RX ADMIN — Medication 10 ML: at 16:58

## 2021-09-07 RX ADMIN — ENOXAPARIN SODIUM 40 MG: 40 INJECTION SUBCUTANEOUS at 09:45

## 2021-09-07 RX ADMIN — FERRIC OXIDE RED: 8; 8 LOTION TOPICAL at 09:33

## 2021-09-07 RX ADMIN — CEFEPIME HYDROCHLORIDE 2 G: 2 INJECTION, POWDER, FOR SOLUTION INTRAVENOUS at 09:33

## 2021-09-07 RX ADMIN — Medication 10 ML: at 05:32

## 2021-09-07 NOTE — PROGRESS NOTES
Infectious Disease Progress Note         Interval:  NAEO. Subjective:   Patient seen today in follow-up.  services were used. Patient reports feeling much better overall. Her rash is not itching and she reports its much better as well. She reports dryness on her face of the skin. She reports she is having some trouble opening her left eye but she denies any vision changes blurry vision pain in the eyes. Objective:    Vitals:   Reviewed in chart. Physical Exam:  Gen: No apparent distress  HEENT:  Normocephalic, atraumatic, no scleral icterus, no oral or mucosal lesions, ulcerations or peeling. CV: off pressors   Lungs: Room air  Abdomen: soft, non tender, non distended  Genitourinary:    goldman catheter   Skin: Rash is almost resolved everywhere. There is disclamation on her face and dryness on the face. Patient's left eye appears a little harder to open. Pupils are equal and reactive to light bilaterally.   Psych: good affect, good eye contact, non tearful  Neuro: alert, oriented to time,  place, and situation, moves all extremities to commands, verbal  Musculoskeletal:  No joint edema, erythema or tenderness noted           Labs:  Recent Results (from the past 24 hour(s))   LACTIC ACID    Collection Time: 09/06/21 11:14 AM   Result Value Ref Range    Lactic acid 1.9 0.4 - 2.0 MMOL/L   CBC WITH AUTOMATED DIFF    Collection Time: 09/06/21 11:14 AM   Result Value Ref Range    WBC 3.2 (L) 3.6 - 11.0 K/uL    RBC 3.56 (L) 3.80 - 5.20 M/uL    HGB 9.4 (L) 11.5 - 16.0 g/dL    HCT 29.5 (L) 35.0 - 47.0 %    MCV 82.9 80.0 - 99.0 FL    MCH 26.4 26.0 - 34.0 PG    MCHC 31.9 30.0 - 36.5 g/dL    RDW 17.0 (H) 11.5 - 14.5 %    PLATELET 813 303 - 450 K/uL    MPV 11.5 8.9 - 12.9 FL    NRBC 0.0 0  WBC    ABSOLUTE NRBC 0.00 0.00 - 0.01 K/uL    NEUTROPHILS 50 32 - 75 %    LYMPHOCYTES 33 12 - 49 %    MONOCYTES 9 5 - 13 %    EOSINOPHILS 7 0 - 7 %    BASOPHILS 0 0 - 1 %    IMMATURE GRANULOCYTES 1 (H) 0.0 - 0.5 %    ABS. NEUTROPHILS 1.6 (L) 1.8 - 8.0 K/UL    ABS. LYMPHOCYTES 1.1 0.8 - 3.5 K/UL    ABS. MONOCYTES 0.3 0.0 - 1.0 K/UL    ABS. EOSINOPHILS 0.2 0.0 - 0.4 K/UL    ABS. BASOPHILS 0.0 0.0 - 0.1 K/UL    ABS. IMM.  GRANS. 0.0 0.00 - 0.04 K/UL    DF AUTOMATED     METABOLIC PANEL, BASIC    Collection Time: 09/06/21 11:14 AM   Result Value Ref Range    Sodium 139 136 - 145 mmol/L    Potassium 3.4 (L) 3.5 - 5.1 mmol/L    Chloride 108 97 - 108 mmol/L    CO2 28 21 - 32 mmol/L    Anion gap 3 (L) 5 - 15 mmol/L    Glucose 91 65 - 100 mg/dL    BUN 6 6 - 20 MG/DL    Creatinine 0.65 0.55 - 1.02 MG/DL    BUN/Creatinine ratio 9 (L) 12 - 20      GFR est AA >60 >60 ml/min/1.73m2    GFR est non-AA >60 >60 ml/min/1.73m2    Calcium 7.6 (L) 8.5 - 10.1 MG/DL   MAGNESIUM    Collection Time: 09/06/21 11:14 AM   Result Value Ref Range    Magnesium 2.0 1.6 - 2.4 mg/dL   PHOSPHORUS    Collection Time: 09/06/21 11:14 AM   Result Value Ref Range    Phosphorus 2.1 (L) 2.6 - 4.7 MG/DL   VANCOMYCIN, TROUGH    Collection Time: 09/06/21 11:14 AM   Result Value Ref Range    Vancomycin,trough 14.4 (H) 5.0 - 10.0 ug/mL    Reported dose date NOT PROVIDED      Reported dose time: NOT PROVIDED      Reported dose: NOT PROVIDED UNITS   LACTIC ACID    Collection Time: 09/07/21  2:11 AM   Result Value Ref Range    Lactic acid 0.9 0.4 - 2.0 MMOL/L   MAGNESIUM    Collection Time: 09/07/21  2:11 AM   Result Value Ref Range    Magnesium 1.9 1.6 - 2.4 mg/dL   PHOSPHORUS    Collection Time: 09/07/21  2:11 AM   Result Value Ref Range    Phosphorus 3.0 2.6 - 4.7 MG/DL   METABOLIC PANEL, BASIC    Collection Time: 09/07/21  2:11 AM   Result Value Ref Range    Sodium 140 136 - 145 mmol/L    Potassium 3.1 (L) 3.5 - 5.1 mmol/L    Chloride 106 97 - 108 mmol/L    CO2 27 21 - 32 mmol/L    Anion gap 7 5 - 15 mmol/L    Glucose 100 65 - 100 mg/dL    BUN 9 6 - 20 MG/DL    Creatinine 0.56 0.55 - 1.02 MG/DL    BUN/Creatinine ratio 16 12 - 20      GFR est AA >60 >60 ml/min/1.73m2    GFR est non-AA >60 >60 ml/min/1.73m2    Calcium 7.5 (L) 8.5 - 10.1 MG/DL   CBC WITH AUTOMATED DIFF    Collection Time: 09/07/21  2:11 AM   Result Value Ref Range    WBC 3.5 (L) 3.6 - 11.0 K/uL    RBC 3.19 (L) 3.80 - 5.20 M/uL    HGB 8.6 (L) 11.5 - 16.0 g/dL    HCT 26.4 (L) 35.0 - 47.0 %    MCV 82.8 80.0 - 99.0 FL    MCH 27.0 26.0 - 34.0 PG    MCHC 32.6 30.0 - 36.5 g/dL    RDW 16.9 (H) 11.5 - 14.5 %    PLATELET 765 901 - 959 K/uL    MPV 12.0 8.9 - 12.9 FL    NRBC 0.0 0  WBC    ABSOLUTE NRBC 0.00 0.00 - 0.01 K/uL    NEUTROPHILS 57 32 - 75 %    LYMPHOCYTES 30 12 - 49 %    MONOCYTES 8 5 - 13 %    EOSINOPHILS 5 0 - 7 %    BASOPHILS 0 0 - 1 %    IMMATURE GRANULOCYTES 0 0.0 - 0.5 %    ABS. NEUTROPHILS 1.9 1.8 - 8.0 K/UL    ABS. LYMPHOCYTES 1.1 0.8 - 3.5 K/UL    ABS. MONOCYTES 0.3 0.0 - 1.0 K/UL    ABS. EOSINOPHILS 0.2 0.0 - 0.4 K/UL    ABS. BASOPHILS 0.0 0.0 - 0.1 K/UL    ABS. IMM. GRANS. 0.0 0.00 - 0.04 K/UL    DF MANUAL      RBC COMMENTS ANISOCYTOSIS  1+                   Assessment:  - Severe drug reaction with rash, systemic malaise. Per history this appears to be to be likely due to clindamycin. Not assessed to be anaphylaxis given patient tolerated multiple doses of clindamycin before she presented to the ED. Hypotension on arrival likely due to the inflammatory response of the drug rash and dehydration. 9/4/21: Clinically, patient reporting to feel better than yesterday. More comfortable on exam, more awake. Rash is not worsening today. Not assessed to be EM/SJS/TEN. Mild blistering (likely early-stage) on inner thigh of legs; not assessed to be worsening of the rash. She is off pressors as of this morning. HIV negative. Recommendations:  Can stop vancomycin and cefepime today. Continue to monitor off antimicrobials at this point. Will follow. Thank you for the opportunity to participate in the care of this patient. Please contact with questions or concerns. Kris Siegel MD  Infectious Diseases

## 2021-09-07 NOTE — WOUND CARE
Wound care consult for peeling skin after a breakout of blisters secondary to an allergic reaction. Pt. Took antibiotics about one week prior to admission at this facility - Bactrim and keflex for a cellulitis. No photos available for the skin condition earlier in this admission. Assessment today: the face, scalp and neck are dry and small peeling all over the skin. No weeping skin and no itching. The legs were also effected but staff has been using Calazime lotion on the legs and this seems to have helped a lot. No inflammation noted anywhere. Patient is concerned about her \"appearance\". She was reassured that the skin will regenerate fairly quickly. Treatment recommendation:  Use the PH balanced soap (in the pump) to cleanse the skin well. Dry the skin and then apply the daily moisturizer cream at least two times per day to the face, chest, neck and back. The daily moisturizer is made from plants and is natural and latex free. It is also aloe free.    Sukhi Jackson RN, BSN, [facility]

## 2021-09-07 NOTE — PROGRESS NOTES
End of Shift Note     Bedside shift change report given to Marimar Perez RN (oncoming nurse) by April Martinez RN (offgoing nurse). Report included the following information SBAR, Kardex, Intake/Output and MAR     Shift worked: 7a-3p   Shift summary and any significant changes:     None            Concerns for physician to address:  none   Zone phone for oncoming shift:   [phone]      Patient Information  David Shock  28 y.o.  9/2/2021 11:42 PM by Renetta Wesley MD. David Shock was admitted from Home     Problem List       Patient Active Problem List     Diagnosis Date Noted    Sepsis ([facility] [address] 09/02/2021           Past Medical History:   Diagnosis Date    Eczema              Activity:  Activity Level: Up with Assistance  Number times ambulated in hallways past shift: 0  Number of times OOB to chair past shift: 00     Cardiac:   Cardiac Monitoring: Yes      Cardiac Rhythm: Sinus Tachy     Access:   Current line(s): PIV      Genitourinary:   Urinary status: voiding        Respiratory:   O2 Device: None (Room air)  Chronic home O2 use?: YES  Incentive spirometer at bedside: NO     GI:  Last Bowel Movement Date: 09/04/21  Current diet:  DIET ONE TIME MESSAGE  ADULT DIET Regular  Passing flatus: YES  Tolerating current diet: YES     Pain Management:   Patient states pain is manageable on current regimen: YES     Skin:  Anastacio Score: 17  Interventions: increase time out of bed    Patient Safety:  Fall Score:  Total Score: 1  Interventions: bed/chair alarm, assistive device (walker, cane, etc), gripper socks and pt to call before getting OOB  High Fall Risk: Yes  @Rollbelt  @dexterity to release roll belt  Yes/No ( must document dexterity  here by stating Yes or No here, otherwise this is a restraint and must follow restraint documentation policy.)     DVT prophylaxis:  DVT prophylaxis Med- N0  DVT prophylaxis SCD or SHANTHI- No      Wounds: (If Applicable)  Wounds- yes  Location scattered blisters, rash and skin peeling off     Active Consults:  IP CONSULT TO INFECTIOUS DISEASES  IP CONSULT TO CARDIOLOGY     Length of Stay:  Expected LOS: 4d 19h  Actual LOS: 5  Discharge Plan: Florence Jacob RN

## 2021-09-07 NOTE — PROGRESS NOTES
Problem: Falls - Risk of  Goal: *Absence of Falls  Description: Document Tristan Pugh Fall Risk and appropriate interventions in the flowsheet. Outcome: Progressing Towards Goal  Note: Fall Risk Interventions:  Mobility Interventions: Bed/chair exit alarm, Patient to call before getting OOB         Medication Interventions: Patient to call before getting OOB, Teach patient to arise slowly    Elimination Interventions: Bed/chair exit alarm, Call light in reach, Patient to call for help with toileting needs, Stay With Me (per policy)              Problem: Pressure Injury - Risk of  Goal: *Prevention of pressure injury  Description: Document Anastacio Scale and appropriate interventions in the flowsheet.   Outcome: Progressing Towards Goal  Note: Pressure Injury Interventions:  Sensory Interventions: Minimize linen layers    Moisture Interventions: Apply protective barrier, creams and emollients    Activity Interventions: Increase time out of bed    Mobility Interventions: HOB 30 degrees or less, Pressure redistribution bed/mattress (bed type)    Nutrition Interventions: Offer support with meals,snacks and hydration    Friction and Shear Interventions: Minimize layers

## 2021-09-07 NOTE — PROGRESS NOTES
Problem: Falls - Risk of  Goal: *Absence of Falls  Description: Document Yue Starch Fall Risk and appropriate interventions in the flowsheet. 9/7/2021 0433 by Kasia Shah RN  Outcome: Progressing Towards Goal  Note: Fall Risk Interventions:  Mobility Interventions: Bed/chair exit alarm         Medication Interventions: Patient to call before getting OOB    Elimination Interventions: Call light in reach           9/7/2021 0431 by Kasia Shah RN  Outcome: Progressing Towards Goal  Note: Fall Risk Interventions:  Mobility Interventions: Bed/chair exit alarm         Medication Interventions: Patient to call before getting OOB    Elimination Interventions: Call light in reach              Problem: Patient Education: Go to Patient Education Activity  Goal: Patient/Family Education  9/7/2021 0433 by Kasia Shah RN  Outcome: Progressing Towards Goal  9/7/2021 0431 by Kasia Shah RN  Outcome: Progressing Towards Goal     Problem: Pressure Injury - Risk of  Goal: *Prevention of pressure injury  Description: Document Anastacio Scale and appropriate interventions in the flowsheet.   9/7/2021 0433 by Kasia Shah RN  Outcome: Progressing Towards Goal  Note: Pressure Injury Interventions:  Sensory Interventions: Minimize linen layers    Moisture Interventions: Minimize layers    Activity Interventions: Increase time out of bed    Mobility Interventions: Assess need for specialty bed    Nutrition Interventions: Document food/fluid/supplement intake    Friction and Shear Interventions: Minimize layers             9/7/2021 0431 by Kasia Shah RN  Outcome: Progressing Towards Goal  Note: Pressure Injury Interventions:  Sensory Interventions: Minimize linen layers    Moisture Interventions: Minimize layers    Activity Interventions: Increase time out of bed    Mobility Interventions: Assess need for specialty bed    Nutrition Interventions: Document food/fluid/supplement intake    Friction and Shear Interventions: Minimize layers                Problem: Patient Education: Go to Patient Education Activity  Goal: Patient/Family Education  9/7/2021 0433 by Mamie Nava RN  Outcome: Progressing Towards Goal  9/7/2021 0431 by Mamie Nava RN  Outcome: Progressing Towards Goal

## 2021-09-07 NOTE — PROGRESS NOTES
Problem: Falls - Risk of  Goal: *Absence of Falls  Description: Document Fady Cadena Fall Risk and appropriate interventions in the flowsheet. Outcome: Progressing Towards Goal  Note: Fall Risk Interventions:  Mobility Interventions: Bed/chair exit alarm         Medication Interventions: Patient to call before getting OOB    Elimination Interventions: Call light in reach              Problem: Patient Education: Go to Patient Education Activity  Goal: Patient/Family Education  Outcome: Progressing Towards Goal     Problem: Pressure Injury - Risk of  Goal: *Prevention of pressure injury  Description: Document Anastacio Scale and appropriate interventions in the flowsheet.   Outcome: Progressing Towards Goal  Note: Pressure Injury Interventions:  Sensory Interventions: Minimize linen layers    Moisture Interventions: Minimize layers    Activity Interventions: Increase time out of bed    Mobility Interventions: Assess need for specialty bed    Nutrition Interventions: Document food/fluid/supplement intake    Friction and Shear Interventions: Minimize layers                Problem: Patient Education: Go to Patient Education Activity  Goal: Patient/Family Education  Outcome: Progressing Towards Goal

## 2021-09-07 NOTE — PROGRESS NOTES
Transition of Care Plan:    RUR:13% low risk  Disposition:Home with f/u appts  Follow up appointments:PCP and specialists as indicated  DME needed:none  Transportation at [address] or means to access home: pt has access       IM Medicare Letter:N/A  Is patient a BCPI-A Bundle: N/A         If yes, was Bundle Letter given?:     Caregiver Contact:Sister Bailey [facility] [phone]  Discharge Caregiver contacted prior to discharge? Per pt request    Reason for Admission:  sepsis                  RUR Score: 13% low risk                  Plan for utilizing home health: no needs at this time         PCP: First and Last name:  Madelaine Storey PA-C   Name of Practice: [facility]   Are you a current patient: Yes/No: yes   Approximate date of last visit: las 6 months   Can you participate in a virtual visit with your PCP: no                    Current Advanced Directive/Advance Care Plan:   Advance Care Planning     General Advance Care Planning (ACP) Conversation      Date of Conversation: 9/2/2021  Conducted with: Patient with Decision Making Capacity    Healthcare Decision Maker:   Sister Bailey [facility] [phone]    Content/Action Overview:   DECLINED ACP conversation - will revisit periodically   Reviewed DNR/DNI and patient elects Full Code (Attempt Resuscitation)    Length of Voluntary ACP Conversation in minutes:  <16 minutes (Non-Billable)                        Transition of Care Plan:  Cwith f/u appts    Initial note:  Chart reviewed. CM met with pt to introduce role and completer assessment via . Pt lives with her children in an apartment on the first floor with 2 DHIRAJ. Pt is independent with ADL's and IADL's and drives. Pt has no SNF/IPR/HH hx.  Her preferred pharmacy is [facility] on [facility]. Pt is unemployed and says she may have difficulty paying for any medications prescribed this visit but she does have Medicaid.   Pt denies any questions/concerns at this time. CM will continue to monitor for d/c needs. Care Management Interventions  PCP Verified by CM: Yes  Palliative Care Criteria Met (RRAT>21 & CHF Dx)?: No  Mode of Transport at Discharge:  Other (see comment) (A friend will pick pt up at d/c.)  Transition of Care Consult (CM Consult): Discharge Planning  Physical Therapy Consult: No  Occupational Therapy Consult: No  Speech Therapy Consult: No  Support Systems: Child(johnson), Other Family Member(s)  Confirm Follow Up Transport: Friends  The Patient and/or Patient Representative was Provided with a Choice of Provider and Agrees with the Discharge Plan?: No  Freedom of Choice List was Provided with Basic Dialogue that Supports the Patient's Individualized Plan of Care/Goals, Treatment Preferences and Shares the Quality Data Associated with the Providers?: No  [place] Resource Information Provided?: No  Discharge Location  Discharge Placement: [facility]IC[facility] Wilson  Care Manager

## 2021-09-07 NOTE — PROGRESS NOTES
End of Shift Note    Bedside shift change report given to Roxane Mcgarry RN (oncoming nurse) by Piper Baxter RN (offgoing nurse). Report included the following information SBAR, Kardex, Intake/Output and MAR    Shift worked:  night   Shift summary and any significant changes:     IVF, IV ABX, skin care, call bell and phone within reach, safety and fall precautions maintained. [place] speaking interpretor in use. Concerns for physician to address:  none   Zone phone for oncoming shift:   [phone]     Patient Information  Delmis Espinoza  28 y.o.  9/2/2021 11:42 PM by Sangeeta Springer MD. Delmis Espinoza was admitted from Home    Problem List  Patient Active Problem List    Diagnosis Date Noted    Sepsis [facility]) 09/02/2021     Past Medical History:   Diagnosis Date    Eczema          Activity:  Activity Level: Up with Assistance  Number times ambulated in hallways past shift: 0  Number of times OOB to chair past shift: 0    Cardiac:   Cardiac Monitoring: Yes      Cardiac Rhythm: Sinus Tachy    Access:   Current line(s): PIV     Genitourinary:   Urinary status: voiding      Respiratory:   O2 Device: None (Room air)  Chronic home O2 use?: YES  Incentive spirometer at bedside: NO       GI:  Last Bowel Movement Date: 09/04/21  Current diet:  DIET ONE TIME MESSAGE  ADULT DIET Regular  Passing flatus: YES  Tolerating current diet: YES       Pain Management:   Patient states pain is manageable on current regimen: YES    Skin:  Anastacio Score: 17  Interventions: increase time out of bed    Patient Safety:  Fall Score:  Total Score: 1  Interventions: bed/chair alarm, assistive device (walker, cane, etc), gripper socks and pt to call before getting OOB  High Fall Risk: Yes  @Rollbelt  @dexterity to release roll belt  Yes/No ( must document dexterity  here by stating Yes or No here, otherwise this is a restraint and must follow restraint documentation policy.)    DVT prophylaxis:  DVT prophylaxis Med- N0  DVT prophylaxis SCD or SHANTHI- No     Wounds: (If Applicable)  Wounds- yes  Location scattered blisters, rash and skin peeling off    Active Consults:  IP CONSULT TO INFECTIOUS DISEASES  IP CONSULT TO CARDIOLOGY    Length of Stay:  Expected LOS: 4d 19h  Actual LOS: 5  Discharge Plan: No       Jose M Martinez, RN

## 2021-09-07 NOTE — PROGRESS NOTES
Hospitalist Progress Note    NAME: Christa Contreras   :  1990   MRN:  [id number]     Interim Hospital Summary: 28 y.o. female whom presented on 2021 with rash, fever, nausea and vomiting. Patient was diagnosed 1 week PTA with cellulitis and treated with Bactrim and Keflex. Patient developed a rash and presented to [facility]. The rash was felt to be an allergic reaction possibly from Bactrim so the patient was switched to clindamycin. With no improvement, she will she presents to [facility] and subsequently admitted by [address] [place] [address] team for shock and possible allergic reaction. Initially started on vancomycin/cefepime/Flagyl and Levophed support. Vasopressors have been weaned off . Generalized rash is improving, autoimmune work-up sent. Echocardiogram demonstrates new cardiomyopathy EF 45%. Cardiology recommends outpatient evaluation. Transfer out of ICU initiated . Infectious disease following.      Assessment / Plan:    Shock, resolved  Cellulitis, left lower extremity  Lactic acidosis, resolved  Leukocytosis, resolved  -Per chart review: Initial hypotension/shock felt to be related to combination of sepsis and component due to inflammatory response from drug reaction and perhaps some volume depletion as well  -Blood cultures NGTD  -Weaned off vasopressors   -No stress steroids given  -Observe off abx, if patient stable, can be DC'd tomorrow  -Infectious disease input appreciated    Generalized rash   Hx Eczema   -suspected to be drug reaction from clindamycin but autoimmune disease could not be ruled out-work-up sent in this pending    Cardiomyopathy, EF 45-50%  Moderate MR  -Intensivist discussed with on-call cardiology (Dr. Mavis Abreu), recommend can follow-up as outpatient to complete work-up    Significant peripheral edema  -likely multifactorial from initial volume received, the systemic drug reaction and perhaps her underlying CM as well.   -she has been off vasopressors since 9/03     Hyponatremia resolved  Hypokalemia   Hypophosphatemia  -replete prn      Estimated discharge date: September 8  Barriers: Edema, plan from ID    Code status: Full  Prophylaxis: Lovenox  Recommended Disposition: Home w/Family       Subjective:     Chief Complaint / Reason for Physician Visit  Follow up of shock, sepsis, cellulitis, cardiomyopathy, rash  Chart reviewed in detail. Patient seen and examined at the bedside. The language line was used in communication. Still with some foot pain. Very concerned about the dry skin on her face. She denies any nausea vomiting or diarrhea. No chest pain or shortness of breath. She is upset that she is swollen in her skin is peeling. Review of Systems:  Symptom Y/N Comments  Symptom Y/N Comments   Fever/Chills    Chest Pain     Poor Appetite    Edema y    Cough    Abdominal Pain     Sputum    Joint Pain     SOB/EAGLE    Pruritis/Rash     Nausea/vomit    Tolerating PT/OT     Diarrhea    Tolerating Diet     Constipation    Other       Could NOT obtain due to:      PO intake: No data found. Objective:     VITALS:   Last 24hrs VS reviewed since prior progress note. Most recent are:  Patient Vitals for the past 24 hrs:   Temp Pulse Resp BP SpO2   09/07/21 1229 98.3 °F (36.8 °C) 82 16 110/73 99 %   09/07/21 0724 98.3 °F (36.8 °C) 84 17 119/64 97 %   09/07/21 0309 98.6 °F (37 °C) 96 18 117/60 97 %   09/06/21 2322 98.3 °F (36.8 °C) 92 18 125/68 99 %   09/06/21 1930 98.9 °F (37.2 °C) 97 20 114/63 98 %     No intake or output data in the 24 hours ending 09/07/21 1618     I had a face to face encounter, and independently examined this patient on 9/7/2021, as outlined below:  PHYSICAL EXAM:  General: WD, WN. Alert, cooperative, no acute distress, edematous   EENT:  EOMI. Anicteric sclerae. MMM  No stridor  Resp:  CTA bilaterally, no wheezing or rales.   No accessory muscle use  CV:  Regular  rhythm,  + diffuse ext edema  GI:  Soft, Non distended, Non tender. +Bowel sounds  Neurologic:  Alert and oriented X 3, normal speech,   Psych:   Good insight. Not anxious nor agitated  Skin:  + generalized macular rash of trunk and all ext. Dry skin throughout     Reviewed most current lab test results and cultures  YES  Reviewed most current radiology test results   YES  Review and summation of old records today    NO  Reviewed patient's current orders and MAR    YES  PMH/SH reviewed - no change compared to H&P  ________________________________________________________________________  Care Plan discussed with:    Comments   Patient x Via    Family      RN x    Care Manager     Consultant  x  intensivist                     Multidiciplinary team rounds were held today with , nursing, pharmacist and clinical coordinator. Patient's plan of care was discussed; medications were reviewed and discharge planning was addressed. ________________________________________________________________________  Total NON critical care TIME:  35   Minutes    Total CRITICAL CARE TIME Spent:   Minutes non procedure based      Comments   >50% of visit spent in counseling and coordination of care x     This includes time during multidisciplinary rounds if indicated above   ________________________________________________________________________  Dwaine Malone MD     Procedures: see electronic medical records for all procedures/Xrays and details which were not copied into this note but were reviewed prior to creation of Plan. LABS:  I reviewed today's most current labs and imaging studies.   Pertinent labs include:  Recent Labs     09/07/21  0211 09/06/21  1114   WBC 3.5* 3.2*   HGB 8.6* 9.4*   HCT 26.4* 29.5*    206     Recent Labs     09/07/21  0211 09/06/21  1114 09/05/21  0842    139 136   K 3.1* 3.4* 3.6    108 105   CO2 27 28 26    91 84   BUN 9 6 7   CREA 0.56 0.65 0.63   CA 7.5* 7.6* 7.3*   MG 1.9 2.0 1.9   PHOS 3.0 2.1* 1.8*

## 2021-09-08 ENCOUNTER — TELEPHONE (OUTPATIENT)
Dept: FAMILY MEDICINE CLINIC | Age: 31
End: 2021-09-08

## 2021-09-08 VITALS
HEART RATE: 80 BPM | OXYGEN SATURATION: 97 % | RESPIRATION RATE: 18 BRPM | BODY MASS INDEX: 30.97 KG/M2 | HEIGHT: 65 IN | SYSTOLIC BLOOD PRESSURE: 108 MMHG | TEMPERATURE: 98.2 F | WEIGHT: 185.9 LBS | DIASTOLIC BLOOD PRESSURE: 73 MMHG

## 2021-09-08 LAB
ALBUMIN SERPL-MCNC: 2 G/DL (ref 3.5–5)
ALBUMIN/GLOB SERPL: 0.5 {RATIO} (ref 1.1–2.2)
ALP SERPL-CCNC: 52 U/L (ref 45–117)
ALT SERPL-CCNC: 201 U/L (ref 12–78)
ANION GAP SERPL CALC-SCNC: 6 MMOL/L (ref 5–15)
AST SERPL-CCNC: 184 U/L (ref 15–37)
BILIRUB SERPL-MCNC: 0.4 MG/DL (ref 0.2–1)
BUN SERPL-MCNC: 6 MG/DL (ref 6–20)
BUN/CREAT SERPL: 12 (ref 12–20)
C-ANCA TITR SER IF: NORMAL TITER
CALCIUM SERPL-MCNC: 7.9 MG/DL (ref 8.5–10.1)
CHLORIDE SERPL-SCNC: 106 MMOL/L (ref 97–108)
CO2 SERPL-SCNC: 29 MMOL/L (ref 21–32)
CREAT SERPL-MCNC: 0.52 MG/DL (ref 0.55–1.02)
ERYTHROCYTE [DISTWIDTH] IN BLOOD BY AUTOMATED COUNT: 16.8 % (ref 11.5–14.5)
GLOBULIN SER CALC-MCNC: 3.9 G/DL (ref 2–4)
GLUCOSE SERPL-MCNC: 94 MG/DL (ref 65–100)
HAPTOGLOB SERPL-MCNC: 260 MG/DL (ref 30–200)
HCT VFR BLD AUTO: 26.8 % (ref 35–47)
HGB BLD-MCNC: 8.4 G/DL (ref 11.5–16)
LACTATE SERPL-SCNC: 1 MMOL/L (ref 0.4–2)
LDH SERPL L TO P-CCNC: 399 U/L (ref 81–246)
MAGNESIUM SERPL-MCNC: 2.2 MG/DL (ref 1.6–2.4)
MCH RBC QN AUTO: 26.5 PG (ref 26–34)
MCHC RBC AUTO-ENTMCNC: 31.3 G/DL (ref 30–36.5)
MCV RBC AUTO: 84.5 FL (ref 80–99)
NRBC # BLD: 0.02 K/UL (ref 0–0.01)
NRBC BLD-RTO: 0.5 PER 100 WBC
P-ANCA ATYPICAL TITR SER IF: NORMAL TITER
P-ANCA TITR SER IF: NORMAL TITER
PHOSPHATE SERPL-MCNC: 3 MG/DL (ref 2.6–4.7)
PLATELET # BLD AUTO: 380 K/UL (ref 150–400)
PMV BLD AUTO: 10.8 FL (ref 8.9–12.9)
POTASSIUM SERPL-SCNC: 3.3 MMOL/L (ref 3.5–5.1)
PROT SERPL-MCNC: 5.9 G/DL (ref 6.4–8.2)
RBC # BLD AUTO: 3.17 M/UL (ref 3.8–5.2)
SODIUM SERPL-SCNC: 141 MMOL/L (ref 136–145)
VANCOMYCIN SERPL-MCNC: 4.7 UG/ML
WBC # BLD AUTO: 4.3 K/UL (ref 3.6–11)

## 2021-09-08 PROCEDURE — 83735 ASSAY OF MAGNESIUM: CPT

## 2021-09-08 PROCEDURE — 83615 LACTATE (LD) (LDH) ENZYME: CPT

## 2021-09-08 PROCEDURE — 80053 COMPREHEN METABOLIC PANEL: CPT

## 2021-09-08 PROCEDURE — 74011250637 HC RX REV CODE- 250/637: Performed by: NURSE PRACTITIONER

## 2021-09-08 PROCEDURE — 83010 ASSAY OF HAPTOGLOBIN QUANT: CPT

## 2021-09-08 PROCEDURE — 83605 ASSAY OF LACTIC ACID: CPT

## 2021-09-08 PROCEDURE — 80202 ASSAY OF VANCOMYCIN: CPT

## 2021-09-08 PROCEDURE — 84100 ASSAY OF PHOSPHORUS: CPT

## 2021-09-08 PROCEDURE — 36415 COLL VENOUS BLD VENIPUNCTURE: CPT

## 2021-09-08 PROCEDURE — 74011000250 HC RX REV CODE- 250: Performed by: NURSE PRACTITIONER

## 2021-09-08 PROCEDURE — 85027 COMPLETE CBC AUTOMATED: CPT

## 2021-09-08 PROCEDURE — 74011250636 HC RX REV CODE- 250/636: Performed by: NURSE PRACTITIONER

## 2021-09-08 PROCEDURE — 99233 SBSQ HOSP IP/OBS HIGH 50: CPT | Performed by: STUDENT IN AN ORGANIZED HEALTH CARE EDUCATION/TRAINING PROGRAM

## 2021-09-08 PROCEDURE — C9113 INJ PANTOPRAZOLE SODIUM, VIA: HCPCS | Performed by: NURSE PRACTITIONER

## 2021-09-08 RX ADMIN — FERRIC OXIDE RED: 8; 8 LOTION TOPICAL at 10:42

## 2021-09-08 RX ADMIN — Medication 10 ML: at 06:00

## 2021-09-08 RX ADMIN — ENOXAPARIN SODIUM 40 MG: 40 INJECTION SUBCUTANEOUS at 10:33

## 2021-09-08 RX ADMIN — SODIUM CHLORIDE 40 MG: 9 INJECTION, SOLUTION INTRAMUSCULAR; INTRAVENOUS; SUBCUTANEOUS at 10:33

## 2021-09-08 RX ADMIN — ACETAMINOPHEN 650 MG: 325 TABLET ORAL at 10:47

## 2021-09-08 RX ADMIN — FAMOTIDINE 20 MG: 10 INJECTION INTRAVENOUS at 10:33

## 2021-09-08 NOTE — PROGRESS NOTES
End of Shift Note    Bedside shift change report given to Oleksandr Bear RN  (oncoming nurse) by Adam Tavarez RN (offgoing nurse). Report included the following information SBAR, Kardex, Intake/Output and MAR    Shift worked:  night   Shift summary and any significant changes:    POC reviewed, call bell and phone within reach of patient, Citizen of [place] speaking , interpretor in use, made comfortable, tele monitored, rounding in progress. Concerns for physician to address:  none   Zone phone for oncoming shift:        Patient Information  Aditya Martinez  28 y.o.  9/2/2021 11:42 PM by Dre Kaminski MD. Aditya Martinez was admitted from Home    Problem List  Patient Active Problem List    Diagnosis Date Noted    Sepsis [facility]) 09/02/2021     Past Medical History:   Diagnosis Date    Eczema          Activity:  Activity Level: Up with Assistance  Number times ambulated in hallways past shift: 0  Number of times OOB to chair past shift: 3    Cardiac:   Cardiac Monitoring: Yes      Cardiac Rhythm: Sinus Rhythm    Access:   Current line(s): PIV     Genitourinary:   Urinary status: voiding    Respiratory:   O2 Device: None (Room air)  Chronic home O2 use?: NO  Incentive spirometer at bedside: NO       GI:  Last Bowel Movement Date: 09/04/21  Current diet:  DIET ONE TIME MESSAGE  ADULT DIET Regular  Passing flatus: YES  Tolerating current diet: YES       Pain Management:   Patient states pain is manageable on current regimen: YES    Skin:  Anastacio Score: 17  Interventions: increase time out of bed    Patient Safety:  Fall Score:  Total Score: 1  Interventions: bed/chair alarm, assistive device (walker, cane, etc), gripper socks and pt to call before getting OOB  High Fall Risk: Yes  @Rollbelt  @dexterity to release roll belt  Yes/No ( must document dexterity  here by stating Yes or No here, otherwise this is a restraint and must follow restraint documentation policy.)    DVT prophylaxis:  DVT prophylaxis Med- No  DVT prophylaxis SCD or SHANTHI- No     Wounds: (If Applicable)  Wounds- Yes  Location scattered skin peeling, blisters and rash.     Active Consults:  IP CONSULT TO INFECTIOUS DISEASES  IP CONSULT TO CARDIOLOGY    Length of Stay:  Expected LOS: 4d 19h  Actual LOS: 6  Discharge Plan: Yes       Mirella Rubin RN

## 2021-09-08 NOTE — PROGRESS NOTES
Transition of Care Plan:     RUR:13% - \"low risk\"  Disposition: Home with f/u appts  Follow up appointments: PCP and specialists as indicated  DME needed: No DME needs identified for d/c  Transportation at Discharge: Pt's friend to provide transportation at d/c; projected arrival reported for 12:30 PM  Keys or means to access home: Pt has access to her home    IM Medicare Letter: N/A - Medicaid coverage   Is patient a BCPI-A Bundle: N/A        Caregiver Contact: Pt's friend Rafaela Blankenship: [phone])  Discharge Caregiver contacted prior to discharge? Pt contacted her friend/caregiver the day of d/c (9/8/21)    Update - 11:23 AM: CM acknowledged d/c. CM met with pt at bedside to introduce role and review plan for d/c. CM utilized  services throughout the duration of the encounter. CM reviewed plan for d/c & confirmed pt is agreeable; no immediate questions, concerns, or needs identified. Pt confirmed her friend will be providing transportation at d/c & is expected to arrive around 12:30 PM. Per CM initial assessment completed 9/7/21, pt expressed concern regarding her ability to pay for medication at d/c. CM reiterated that pt has active Medicaid coverage and should not experience any financial barriers accessing her medication; pt verbalized understanding. No further CM needs identified. CM notified pt's nurse of d/c. Initial note: CM reviewed pt's chart and completed IDR with medical team prior to moving forward with d/c planning. MD reported pt is medically stable for d/c today. In anticipation for d/c, CM sent request to CM specialist to secure PCP f/u apt for d/c; apt secured for 9/13/21 at 9:30 AM. PCP f/u apt information reflected in pt's AVS for reference. CM also placed [facility]'s information in pt's AVS for reference.  RN confirmed pt's friend Rafaela Blankenship: [phone]) will provide transportation at d/c; anticipated arrival reported for 12:30 PM. CM will meet with pt at bedside to review YAZAN plan for d/c re: home with f/u apts. CM will continue to follow & remain accessible for d/c planning. Care Management Interventions  PCP Verified by CM: Yes  Palliative Care Criteria Met (RRAT>21 & CHF Dx)?: No  Mode of Transport at Discharge:  Other (see comment) (Pt's friend to provide transportation at d/c)  Hospital Transport Time of Discharge: [address] (CM Consult): Discharge Planning  Discharge Durable Medical Equipment: No (No DME needs identified for d/c)  Physical Therapy Consult: No  Occupational Therapy Consult: No  Speech Therapy Consult: No  Support Systems: Child(johnson), Friend/Neighbor, Other Family Member(s)  Confirm Follow Up Transport: Friends  The Patient and/or Patient Representative was Provided with a Choice of Provider and Agrees with the Discharge Plan?: No  Freedom of Choice List was Provided with Basic Dialogue that Supports the Patient's Individualized Plan of Care/Goals, Treatment Preferences and Shares the Quality Data Associated with the Providers?: No   Resource Information Provided?: No  Discharge Location  Discharge Placement: Home (f/u apts)    Ric Todd, [address] [place] [address], [address] [place] [address]

## 2021-09-08 NOTE — TELEPHONE ENCOUNTER
----- Message from Rodo Gasca MD sent at 9/8/2021 11:55 AM EDT -----  Please schedule her for clinic follow up 9/15/21. She is currently inpatient and getting discharged today. Serbian speaking.      Msg back.     -Alicja Healy

## 2021-09-08 NOTE — DISCHARGE INSTRUCTIONS
HOSPITALIST DISCHARGE INSTRUCTIONS    NAME: Aditya Martinez   :  1990   MRN:  [id number]     Date/Time:  2021 11:15 AM    ADMIT DATE: 2021   DISCHARGE DATE: 2021         · It is important that you take the medication exactly as they are prescribed. · Keep your medication in the bottles provided by the pharmacist and keep a list of the medication names, dosages, and times to be taken in your wallet. · Do not take other medications without consulting your doctor. What to do at Home    Recommended diet:  Regular Diet    Recommended activity: Activity as tolerated      If you have questions regarding the hospital related prescriptions or hospital related issues please call [facility] at [phone]. You can always direct your questions to your primary care doctor if you are unable to reach your hospital physician; your PCP works as an extension of your hospital doctor just like your hospital doctor is an extension of your PCP for your time at the hospital [facility])    If you experience any of the following symptoms then please call your primary care physician or return to the emergency room if you cannot get hold of your doctor:    Fever, chills, nausea, vomiting, or persistent diarrhea  Worsening weakness or new problems with your speech or balance  Dark stools or visible blood in your stools  New Leg swelling or shortness of breath as these could be signs of a clot    Additional Instructions:    Avoid taking any new medications without talking with her PCP first    Apply the Eucerin cream to the areas of her skin that are peeling or feels dry. Do not use any over the counter topical antibiotics, without talking with your PCP, as you may react to those. Information obtained by :  I understand that if any problems occur once I am at home I am to contact my physician.     I understand and acknowledge receipt of the instructions indicated above.                                                                                                                                           Physician's or R.N.'s Signature                                                                  Date/Time                                                                                                                                              Patient or Representative Signature

## 2021-09-08 NOTE — DISCHARGE SUMMARY
Hospitalist Discharge Summary     Patient ID:  Manolo Grijalva  [id number]  8 y.o.  1990    PCP on record: Enrique Dash PA-C    Admit date: 9/2/2021  Discharge date and time: 9/8/2021      DISCHARGE DIAGNOSIS:    Shock, resolved  Cellulitis, left lower extremity, resolved  Lactic acidosis, resolved  Leukocytosis, resolved  Generalized rash, resolving  Hx Eczema   Cardiomyopathy, EF 45-50%, new dx  Moderate MR, new dx  Significant peripheral edema, slowly resolving  Hyponatremia resolved  Hypokalemia   Hypophosphatemia      CONSULTATIONS:  IP CONSULT TO INFECTIOUS DISEASES  IP CONSULT TO CARDIOLOGY    Excerpted HPI from H&P of Antwan Domínguez MD:  28 y.o. female with a PMH of eczema who presents via EMS with rash, fever, nausea and vomiting.  Patient states she was diagnosed with cellulitis about x1wk and started on Bactrim and Keflex.  Pt then developed a rash 4 days ago and was then seen at [facility] yesterday for an allergic reaction likely from the Bactrim so they advised patient to stop the Bactrim and Keflex and started her on clindamycin.  Patient states she is no better today and is having fever, weakness, nausea and vomiting x 2 days.  LMP now.  Patient states she was given Benadryl yesterday but has not taken any medication today.     Accepted patient to ICU for continued Mx.     On ICU admission, patient awake and alert, mild distress. c/o fevers, fatigue, nausea and vomiting. req levophed gtt to sustain hemodynamic stability.    ______________________________________________________________________  DISCHARGE SUMMARY/HOSPITAL COURSE:  for full details see H&P, daily progress notes, labs, consult notes. Interim Hospital Summary: 28 y.o. female whom presented on 9/2/2021 with rash, fever, nausea and vomiting. Patient was diagnosed 1 week PTA with cellulitis and treated with Bactrim and Keflex.  Patient developed a rash and presented to [facility]. The rash was felt to be an allergic reaction possibly from Bactrim so the patient was switched to clindamycin. With no improvement, she will she presents to [id number] [place] [address] and subsequently admitted by [address] [place] [address] team for shock and possible allergic reaction. Initially started on vancomycin/cefepime/Flagyl and Levophed support. Vasopressors have been weaned off 9/03. Generalized rash is improving, autoimmune work-up sent. Echocardiogram demonstrates new cardiomyopathy EF 45%. Cardiology recommends outpatient evaluation. Transfer out of ICU initiated 9/03. Infectious disease following. Shock, resolved  Cellulitis, left lower extremity  Lactic acidosis, resolved  Leukocytosis, resolved  -Per chart review: Initial hypotension/shock felt to be related to combination of sepsis and component due to inflammatory response from drug reaction and perhaps some volume depletion as well  -Blood cultures NGTD  -Weaned off vasopressors 9/03. BP remained stable  -No stress steroids given  -Infectious disease input appreciated. Completed course abx for cellulitis.      Generalized rash   Hx Eczema   -suspected to be drug reaction from clindamycin but autoimmune disease could not be ruled out  - So far KENDAL NEG. HIV non reactive. -  ANCA pending. Follow up with her PCP .      Cardiomyopathy, EF 45-50%  Moderate MR  -Intensivist discussed with on-call cardiology (Dr. Becca Costello), recommend can follow-up as outpatient to complete work-up. She can follow up with her PCP first and have this arranged as OP, not urgent.      Significant peripheral edema  -likely multifactorial from initial volume received, the systemic drug reaction and perhaps her underlying CM as well. Her swelling is slowly resolving.      Hyponatremia resolved  Hypokalemia   Hypophosphatemia  -repleted    _______________________________________________________________________  Patient seen and examined by me on discharge day.   Pertinent Findings:  Gen:    Not in distress  Chest: Clear lungs  CVS:   Regular rhythm.  ++ edema  Abd:  Soft, not distended, not tender  Neuro:  Alert, Oriented x 4, grossly non focal exam  Skin Neck and trunk skin peeling. Macular rash throughout body fading away  _______________________________________________________________________  DISCHARGE MEDICATIONS:   Current Discharge Medication List      START taking these medications    Details   emollient combination no. 114 (Eucerin Advanced Repair) crea 1 Bottle by Apply Externally route two (2) times a day. Qty: 1 Each, Refills: 0  Start date: 9/7/2021         STOP taking these medications       clindamycin (CLEOCIN) 300 mg capsule Comments:   Reason for Stopping:         triamcinolone acetonide (KENALOG) 0.1 % ointment Comments:   Reason for Stopping:         hydrOXYzine pamoate (VISTARIL) 25 mg capsule Comments:   Reason for Stopping:               My Recommended Diet, Activity, Wound Care, and follow-up labs are listed in the patient's Discharge Insturctions which I have personally completed and reviewed.   Risk of deterioration: Low    Condition at Discharge:  Stable  _____________________________________________________________________    Disposition  Home with family, no needs  ____________________________________________________________________    Care Plan discussed with:   Patient, Family, RN, Care Manager, Consultant    ____________________________________________________________________    Code Status: Full Code  ____________________________________________________________________      Condition at Discharge:  Stable  _____________________________________________________________________  Follow up with:   PCP : Erika Gutiérrez PA-C  Follow-up Information     Follow up With Specialties Details Why Contact Rosas King PA-C Physician Assistant Go on 9/13/2021 For hospital follow up appointment at [address] [facility] [address] [facility]  [phone] Wound Care instructions for d/c   Use the PH balanced soap (in the pump) to cleanse the skin well. Dry the skin and then apply the daily moisturizer cream at least two times per day to the face, chest, neck and back.  The daily moisturizer is made from plants and is natural and latex free complete as instructed by wound care RN    [facility]   Call As needed [facility]  [phone]              Total time in minutes spent coordinating this discharge (includes going over instructions, follow-up, prescriptions, and preparing report for sign off to her PCP) :  35 minutes    Signed:  Reyes Pina MD

## 2021-09-08 NOTE — PROGRESS NOTES
Problem: Falls - Risk of  Goal: *Absence of Falls  Description: Document Augustina Pretty Fall Risk and appropriate interventions in the flowsheet. Outcome: Progressing Towards Goal  Note: Fall Risk Interventions:  Mobility Interventions: Bed/chair exit alarm         Medication Interventions: Patient to call before getting OOB    Elimination Interventions: Call light in reach, Bed/chair exit alarm              Problem: Patient Education: Go to Patient Education Activity  Goal: Patient/Family Education  Outcome: Progressing Towards Goal     Problem: Pressure Injury - Risk of  Goal: *Prevention of pressure injury  Description: Document Anastacio Scale and appropriate interventions in the flowsheet.   Outcome: Progressing Towards Goal  Note: Pressure Injury Interventions:  Sensory Interventions: Minimize linen layers    Moisture Interventions: Minimize layers    Activity Interventions: Increase time out of bed    Mobility Interventions: Assess need for specialty bed    Nutrition Interventions: Document food/fluid/supplement intake    Friction and Shear Interventions: Minimize layers                Problem: Patient Education: Go to Patient Education Activity  Goal: Patient/Family Education  Outcome: Progressing Towards Goal

## 2021-09-09 LAB
BACTERIA SPEC CULT: NORMAL
SERVICE CMNT-IMP: NORMAL

## 2021-09-09 NOTE — PROGRESS NOTES
Physician Progress Note      LISBETH Tate  [facility] #:                  [id number]  :                       1990  ADMIT DATE:       2021 11:42 PM  [address] [place] DATE:        2021 2:06 PM  RESPONDING  PROVIDER #:        Shira Barroso MD          QUERY TEXT:    Patient admitted with Sepsis. Documentation reflects no indication of sepsis in DC summary dated 21, only shock, lactic acidosis and leukocytosis noted. If possible, please document in the progress notes and discharge summary if Sepsis was: The medical record reflects the following:  Risk Factors: Tx from [address] [place] [address] ED for CCU care due to requiring Levophed drip and concern for shock. presents via EMS with rash, fever, nausea and vomiting  Patient states she was diagnosed with cellulitis about x1wk and started on Bactrim and Keflex. Clinical Indicators:  21 ICU ED [facility] H&P:  She is a 31/F, [place] speaking (although speaks some [place]) who is admitted with septic shock  Plans for this Shift: Sepsis/Cellulitis  21 DC summary: Discharge Summary:-Per chart review: Initial hypotension/shock felt to be related to combination of sepsis and component due to inflammatory response from drug reaction and perhaps some volume depletion as well  -Blood cultures NGTD  -Weaned off vasopressors . ANTIBIOTICS  Antibiotics:  Skin Soft Tissue Infections --> Vancomycin and Zosyn; Vancomycin + Cefepime + Flagyl  Vancomycin    21 - 9/3/21  2203  0100- BP 84/38; Levophed restarted at [address] 0510- Oral temp 102.9. NP notified; order placed for paired blood cultures. 0550- 650mg oral Tylenol given.     9/3/21 0500 T 102.9  RR 35 /57  9/3/21 0100  NOREPINephrine (LEVOPHED) 8 mg in 5% dextrose 250mL (32 mcg/mL) infusion    Labs  9/3/2021 00:33 WBC: 18.1 (H)  9/3/2021 00:33 Lactic acid: 3.0 (HH)  2021 04:18 NEUTROPHILS: 80 (H) BAND NEUTROPHILS: 6          Treatment: ***  Options provided:  -- Sepsis POA confirmed after study  -- Sepsis POA treated and resolved  -- Sepsis ruled out after study  -- Other - I will add my own diagnosis  -- Disagree - Not applicable / Not valid  -- Disagree - Clinically unable to determine / Unknown  -- Refer to Clinical Documentation Reviewer    PROVIDER RESPONSE TEXT:    Sepsis POA treated and resolved.     Query created by: Shailesh Ricci on 9/9/2021 12:14 PM      Electronically signed by:  Shira Barroso MD 9/9/2021 4:27 PM

## 2022-03-18 PROBLEM — A41.9 SEPSIS (HCC): Status: ACTIVE | Noted: 2021-09-02

## 2024-06-25 NOTE — INTERDISCIPLINARY ROUNDS
Interdisciplinary team rounds were held 9/3/2021 with the following team members:Care Management, Diabetes Treatment Specialist, Nursing, Nutrition, Pharmacy, Physical Therapy, Physician, Respiratory Therapy and Clinical Coordinator. Plan of care discussed. See clinical pathway and/or care plan for interventions and desired outcomes.
impaired balance/decreased flexibility/impaired postural control/decreased ROM/decreased strength